# Patient Record
Sex: MALE | Race: WHITE | URBAN - METROPOLITAN AREA
[De-identification: names, ages, dates, MRNs, and addresses within clinical notes are randomized per-mention and may not be internally consistent; named-entity substitution may affect disease eponyms.]

---

## 2017-05-19 DIAGNOSIS — Z47.1 AFTERCARE FOLLOWING JOINT REPLACEMENT: Primary | ICD-10-CM

## 2017-05-31 ENCOUNTER — HOSPITAL ENCOUNTER (OUTPATIENT)
Dept: NUCLEAR MEDICINE | Facility: CLINIC | Age: 72
Setting detail: NUCLEAR MEDICINE
End: 2017-05-31
Attending: ORTHOPAEDIC SURGERY
Payer: MEDICARE

## 2017-05-31 ENCOUNTER — HOSPITAL ENCOUNTER (OUTPATIENT)
Dept: NUCLEAR MEDICINE | Facility: CLINIC | Age: 72
Setting detail: NUCLEAR MEDICINE
Discharge: HOME | End: 2017-05-31
Attending: ORTHOPAEDIC SURGERY | Admitting: ORTHOPAEDIC SURGERY
Payer: MEDICARE

## 2017-05-31 DIAGNOSIS — M25.551 RIGHT HIP PAIN: ICD-10-CM

## 2017-05-31 PROCEDURE — 34300033 ZZH RX 343: Performed by: ORTHOPAEDIC SURGERY

## 2017-05-31 PROCEDURE — A9561 TC99M OXIDRONATE: HCPCS | Performed by: ORTHOPAEDIC SURGERY

## 2017-05-31 PROCEDURE — 78315 BONE IMAGING 3 PHASE: CPT

## 2017-05-31 RX ADMIN — Medication 26 MILLICURIE: at 09:03

## 2017-06-01 ENCOUNTER — HOSPITAL ENCOUNTER (OUTPATIENT)
Dept: GENERAL RADIOLOGY | Facility: CLINIC | Age: 72
Discharge: HOME | End: 2017-06-01
Attending: ORTHOPAEDIC SURGERY | Admitting: ORTHOPAEDIC SURGERY
Payer: MEDICARE

## 2017-06-01 VITALS — OXYGEN SATURATION: 100 % | DIASTOLIC BLOOD PRESSURE: 74 MMHG | HEART RATE: 78 BPM | SYSTOLIC BLOOD PRESSURE: 154 MMHG

## 2017-06-01 DIAGNOSIS — M25.551 RIGHT HIP PAIN: ICD-10-CM

## 2017-06-01 LAB
APPEARANCE FLD: NORMAL
COLOR FLD: NORMAL
LYMPHOCYTES NFR FLD MANUAL: 13 %
MONOS+MACROS NFR FLD MANUAL: 2 %
NEUTS BAND NFR FLD MANUAL: 85 %
SPECIMEN SOURCE FLD: NORMAL
WBC # FLD AUTO: NORMAL /UL

## 2017-06-01 PROCEDURE — 86140 C-REACTIVE PROTEIN: CPT | Performed by: ORTHOPAEDIC SURGERY

## 2017-06-01 PROCEDURE — 89051 BODY FLUID CELL COUNT: CPT | Performed by: ORTHOPAEDIC SURGERY

## 2017-06-01 PROCEDURE — 25000125 ZZHC RX 250: Performed by: PHYSICIAN ASSISTANT

## 2017-06-01 PROCEDURE — 87075 CULTR BACTERIA EXCEPT BLOOD: CPT | Performed by: ORTHOPAEDIC SURGERY

## 2017-06-01 PROCEDURE — 87070 CULTURE OTHR SPECIMN AEROBIC: CPT | Performed by: ORTHOPAEDIC SURGERY

## 2017-06-01 PROCEDURE — 83516 IMMUNOASSAY NONANTIBODY: CPT | Performed by: ORTHOPAEDIC SURGERY

## 2017-06-01 PROCEDURE — 84311 SPECTROPHOTOMETRY: CPT | Performed by: ORTHOPAEDIC SURGERY

## 2017-06-01 PROCEDURE — 27211111 XR JOINT ASPIRATION MAJOR RIGHT

## 2017-06-01 RX ORDER — LIDOCAINE HYDROCHLORIDE 10 MG/ML
30 INJECTION, SOLUTION EPIDURAL; INFILTRATION; INTRACAUDAL; PERINEURAL ONCE
Status: COMPLETED | OUTPATIENT
Start: 2017-06-01 | End: 2017-06-01

## 2017-06-01 RX ADMIN — LIDOCAINE HYDROCHLORIDE 3 ML: 10 INJECTION, SOLUTION EPIDURAL; INFILTRATION; INTRACAUDAL; PERINEURAL at 09:32

## 2017-06-01 NOTE — DISCHARGE INSTRUCTIONS
Orthopedic Discharge Instructions:   After Your Injection or Aspiration  ________________________________________    Patient Name:  Nhan Marc  Today's Date:  June 1, 2017  The doctor who performed your Joint Aspiration was Alan Stewart PA-C at [facility]  in the  General Radiology Department  Care of needle site    If you have new numbness down your leg, this may last up to 6 hours, but it should go away. You may need help with walking until your leg feels normal.     Over the next 24 to 48 hours, pain at the needle site may increase before it gets better.     For the next 48 hours, use ice packs for 15 minutes, three to four times a day for pain.    If you have a bandage, you may remove it the next morning.     No tub baths, hot tubs or swimming for 48 hours. You may shower the next day.   Activity    Do not drive until morning.     Limit your activity based on your pain level. Follow your doctor s orders for activity.     You may eat a normal diet.     If you had sedation,   - You may feel sleepy, forgetful or unsteady.   - Do not drink alcohol for 24 hours.  Medicines    If you take aspirin or platelet inhibitors, you can restart them tomorrow.     Restart all other medicines today at your regular dose, including Coumadin (warfarin).    If you are restarting Coumadin, talk to your doctor about having your INR checked.   If you had a steroid shot     The medicine should help reduce swelling and pain. This may take from 7 to 10 days.     Side effects from the shot will be mild and go away in 2 to 3 days. Common side effects may include:  -  Insomnia (trouble sleeping).  -  Heartburn.  -  Flushed face.  -  Water retention (bloating or fluid build-up).  -  Increased appetite (feeling more hungry than usual).  -  Increased blood sugar.  If you have diabetes, watch your blood sugar closely. If needed, call your doctor to help you control your blood sugar.  Some patients will get lasting  relief from a single shot. Others may require up to three shots to get results. If you have more than one steroid shot, they should be given two weeks apart.  Some patients do not have relief of symptoms.    Follow-up:  No follow-up needed     Call your doctor or go to the Emergency Room if you have severe pain, fever or problems with bowel or bladder control.

## 2017-06-01 NOTE — PROGRESS NOTES
RADIOLOGY PROCEDURE NOTE  Patient name: Nhan Marc  MRN: [id number]  : 1945    Pre-procedure diagnosis: Right hip pain, rule out infection, right hip arthroplasty  Post-procedure diagnosis: Same    Procedure Date/Time: 2017  9:47 AM  Procedure: Right hip joint aspiration  Estimated blood loss: None  Specimen(s) collected with description: 0.5ml bloody synovial fluid; instilled 3cc saline and re-aspirated 2cc saline from joint  The patient tolerated the procedure well with no immediate complications.  Significant findings:none    See imaging dictation for procedural details.    Provider name: Alan Stewart  Assistant(s):None

## 2017-06-01 NOTE — IP AVS SNAPSHOT
MRN:[id number]                      After Visit Summary   6/1/2017    Nhan Marc    MRN: [id number]           Visit Information        Provider Department      6/1/2017  9:00 AM [facility] RAD; [facility] Radiology           Review of your medicines      UNREVIEWED medicines. Ask your doctor about these medicines        Dose / Directions    ALPHA LIPOIC ACID PO        Dose:  1 tablet   Take 1 tablet by mouth daily OTC- unknown strength   Refills:  0       COENZYME Q-10 PO        Dose:  100 mg   Take 100 mg by mouth daily   Refills:  0       multivitamin, therapeutic with minerals Tabs tablet        Dose:  1 tablet   Take 1 tablet by mouth daily   Refills:  0       NONFORMULARY        Dose:  1 capsule   Take 1 capsule by mouth 3 times daily (with meals) OTC: GC for Joint Health Ingredients Yu[place] Stalk 525mg Aged Garlic 300mg Artichoke Powder 300mg Milk Thistle 225mg Turmeric 150mg   Refills:  0       oxyCODONE 5 MG IR tablet   Commonly known as:  ROXICODONE   Used for:  Left hip pain        Dose:  5-10 mg   Take 1-2 tablets (5-10 mg) by mouth every 4 hours as needed for moderate to severe pain   Quantity:  50 tablet   Refills:  0       PROBIOTIC PO        Dose:  1 capsule   Take 1 capsule by mouth daily   Refills:  0                Protect others around you: Learn how to safely use, store and throw away your medicines at www.disposemymeds.org.         Follow-ups after your visit        Your next 10 appointments already scheduled     Jul 07, 2017   Procedure with Selvin Witt MD   [facility] PeriOP Services (--)    [facility] [address]  [facility] [id number] [phone]               Care Instructions        Further instructions from your care team         Orthopedic Discharge Instructions:   After Your Injection or Aspiration  ________________________________________    Patient Name:  Nhan Marc  Today's Date:  June 1, 2017  The doctor who performed  your Joint Aspiration was Alan Stewart PA-C at [facility]  in the  General Radiology Department  Care of needle site    If you have new numbness down your leg, this may last up to 6 hours, but it should go away. You may need help with walking until your leg feels normal.     Over the next 24 to 48 hours, pain at the needle site may increase before it gets better.     For the next 48 hours, use ice packs for 15 minutes, three to four times a day for pain.    If you have a bandage, you may remove it the next morning.     No tub baths, hot tubs or swimming for 48 hours. You may shower the next day.   Activity    Do not drive until morning.     Limit your activity based on your pain level. Follow your doctor s orders for activity.     You may eat a normal diet.     If you had sedation,   - You may feel sleepy, forgetful or unsteady.   - Do not drink alcohol for 24 hours.  Medicines    If you take aspirin or platelet inhibitors, you can restart them tomorrow.     Restart all other medicines today at your regular dose, including Coumadin (warfarin).    If you are restarting Coumadin, talk to your doctor about having your INR checked.   If you had a steroid shot     The medicine should help reduce swelling and pain. This may take from 7 to 10 days.     Side effects from the shot will be mild and go away in 2 to 3 days. Common side effects may include:  -  Insomnia (trouble sleeping).  -  Heartburn.  -  Flushed face.  -  Water retention (bloating or fluid build-up).  -  Increased appetite (feeling more hungry than usual).  -  Increased blood sugar.  If you have diabetes, watch your blood sugar closely. If needed, call your doctor to help you control your blood sugar.  Some patients will get lasting relief from a single shot. Others may require up to three shots to get results. If you have more than one steroid shot, they should be given two weeks apart.  Some patients do not have relief of  "symptoms.    Follow-up:  No follow-up needed     Call your doctor or go to the Emergency Room if you have severe pain, fever or problems with bowel or bladder control.      Additional Information About Your Visit        [employer] Information     [employer] lets you send messages to your doctor, view your test results, renew your prescriptions, schedule appointments and more. To sign up, go to [email or web address] . Click on \"Log in\" on the left side of the screen, which will take you to the Welcome page. Then click on \"Sign up Now\" on the right side of the page.     You will be asked to enter the access code listed below, as well as some personal information. Please follow the directions to create your username and password.     Your access code is: [email or web address]  Expires: 2017  9:07 AM     Your access code will  in 90 days. If you need help or a new code, please call your [facility] clinic or [phone].        Care EveryWhere ID     This is your Care EveryWhere ID. This could be used by other organizations to access your [facility] medical records  [id number]         Primary Care Provider Office Phone # Fax #    Kole Parker [phone]      Thank you!     Thank you for choosing [facility] for your care. Our goal is always to provide you with excellent care. Hearing back from our patients is one way we can continue to improve our services. Please take a few minutes to complete the written survey that you may receive in the mail after you visit with us. Thank you!             Medication List: This is a list of all your medications and when to take them. Check marks below indicate your daily home schedule. Keep this list as a reference.      Medications           Morning Afternoon Evening Bedtime As Needed    ALPHA LIPOIC ACID PO   Take 1 tablet by mouth daily OTC- unknown strength                                COENZYME Q-10 PO   Take 100 mg by mouth daily                                " multivitamin, therapeutic with minerals Tabs tablet   Take 1 tablet by mouth daily                                NONFORMULARY   Take 1 capsule by mouth 3 times daily (with meals) OTC: GC for Joint Health Ingredients Yucca Stalk 525mg Aged Garlic 300mg Artichoke Powder 300mg Milk Thistle 225mg Turmeric 150mg                                oxyCODONE 5 MG IR tablet   Commonly known as:  ROXICODONE   Take 1-2 tablets (5-10 mg) by mouth every 4 hours as needed for moderate to severe pain                                PROBIOTIC PO   Take 1 capsule by mouth daily

## 2017-06-01 NOTE — IP AVS SNAPSHOT
[facility] Radiology    [address] [address]    Phone:  [phone]                                       After Visit Summary   6/1/2017    Nhan Marc    MRN: [id number]           After Visit Summary Signature Page     I have received my discharge instructions, and my questions have been answered. I have discussed any challenges I see with this plan with the nurse or doctor.    ..........................................................................................................................................  Patient/Patient Representative Signature      ..........................................................................................................................................  Patient Representative Print Name and Relationship to Patient    ..................................................               ................................................  Date                                            Time    ..........................................................................................................................................  Reviewed by Signature/Title    ...................................................              ..............................................  Date                                                            Time

## 2017-06-06 LAB
BACTERIA SPEC CULT: NO GROWTH
Lab: NORMAL
MICRO REPORT STATUS: NORMAL
SPECIMEN SOURCE: NORMAL

## 2017-06-08 LAB — SYNOVASURE PERIPROSTHETIC JOINT INFECTION DECTION: NORMAL

## 2017-06-15 LAB
BACTERIA SPEC CULT: NORMAL
Lab: NORMAL
MICRO REPORT STATUS: NORMAL
SPECIMEN SOURCE: NORMAL

## 2017-06-22 ENCOUNTER — TRANSFERRED RECORDS (OUTPATIENT)
Dept: HEALTH INFORMATION MANAGEMENT | Facility: CLINIC | Age: 72
End: 2017-06-22

## 2017-06-26 ENCOUNTER — TRANSFERRED RECORDS (OUTPATIENT)
Dept: HEALTH INFORMATION MANAGEMENT | Facility: CLINIC | Age: 72
End: 2017-06-26

## 2017-06-30 ENCOUNTER — ANESTHESIA (OUTPATIENT)
Dept: SURGERY | Facility: CLINIC | Age: 72
DRG: 464 | End: 2017-06-30
Payer: MEDICARE

## 2017-06-30 ENCOUNTER — ANESTHESIA EVENT (OUTPATIENT)
Dept: SURGERY | Facility: CLINIC | Age: 72
DRG: 464 | End: 2017-06-30
Payer: MEDICARE

## 2017-06-30 ENCOUNTER — HOSPITAL ENCOUNTER (INPATIENT)
Facility: CLINIC | Age: 72
LOS: 3 days | Discharge: HOME | DRG: 464 | End: 2017-07-03
Attending: ORTHOPAEDIC SURGERY | Admitting: ORTHOPAEDIC SURGERY
Payer: MEDICARE

## 2017-06-30 ENCOUNTER — APPOINTMENT (OUTPATIENT)
Dept: GENERAL RADIOLOGY | Facility: CLINIC | Age: 72
DRG: 464 | End: 2017-06-30
Attending: ORTHOPAEDIC SURGERY
Payer: MEDICARE

## 2017-06-30 DIAGNOSIS — Z96.641 STATUS POST RIGHT HIP REPLACEMENT: Primary | ICD-10-CM

## 2017-06-30 DIAGNOSIS — A49.1 INFECTION DUE TO BETA-HEMOLYTIC STREPTOCOCCUS: ICD-10-CM

## 2017-06-30 DIAGNOSIS — T84.51XA INFECTION OF RIGHT PROSTHETIC HIP JOINT (H): ICD-10-CM

## 2017-06-30 LAB
ABO + RH BLD: NORMAL
ABO + RH BLD: NORMAL
BACTERIA SPEC CULT: NORMAL
BLD GP AB SCN SERPL QL: NORMAL
BLD PROD TYP BPU: NORMAL
BLOOD BANK CMNT PATIENT-IMP: NORMAL
CREAT SERPL-MCNC: 0.68 MG/DL (ref 0.66–1.25)
GFR SERPL CREATININE-BSD FRML MDRD: NORMAL ML/MIN/1.7M2
GRAM STN SPEC: NORMAL
HGB BLD-MCNC: 12.6 G/DL (ref 13.3–17.7)
HGB BLD-MCNC: 9 G/DL (ref 13.3–17.7)
Lab: NORMAL
MICRO REPORT STATUS: NORMAL
NUM BPU REQUESTED: 2
PLATELET # BLD AUTO: 329 10E9/L (ref 150–450)
SPECIMEN EXP DATE BLD: NORMAL
SPECIMEN SOURCE: NORMAL

## 2017-06-30 PROCEDURE — 82803 BLOOD GASES ANY COMBINATION: CPT

## 2017-06-30 PROCEDURE — 25000128 H RX IP 250 OP 636: Performed by: NURSE ANESTHETIST, CERTIFIED REGISTERED

## 2017-06-30 PROCEDURE — 36000063 ZZH SURGERY LEVEL 4 EA 15 ADDTL MIN: Performed by: ORTHOPAEDIC SURGERY

## 2017-06-30 PROCEDURE — A9270 NON-COVERED ITEM OR SERVICE: HCPCS | Mod: GY | Performed by: PHYSICIAN ASSISTANT

## 2017-06-30 PROCEDURE — 27211024 ZZHC OR SUPPLY OTHER OPNP: Performed by: ORTHOPAEDIC SURGERY

## 2017-06-30 PROCEDURE — 88331 PATH CONSLTJ SURG 1 BLK 1SPC: CPT | Mod: 26,76 | Performed by: ORTHOPAEDIC SURGERY

## 2017-06-30 PROCEDURE — 40000986 XR PELVIS AD HIP PORTABLE RIGHT 1 VIEW

## 2017-06-30 PROCEDURE — 87077 CULTURE AEROBIC IDENTIFY: CPT | Performed by: ORTHOPAEDIC SURGERY

## 2017-06-30 PROCEDURE — 27810169 ZZH OR IMPLANT GENERAL: Performed by: ORTHOPAEDIC SURGERY

## 2017-06-30 PROCEDURE — C1776 JOINT DEVICE (IMPLANTABLE): HCPCS | Performed by: ORTHOPAEDIC SURGERY

## 2017-06-30 PROCEDURE — 25000125 ZZHC RX 250: Performed by: NURSE ANESTHETIST, CERTIFIED REGISTERED

## 2017-06-30 PROCEDURE — 71000012 ZZH RECOVERY PHASE 1 LEVEL 1 FIRST HR: Performed by: ORTHOPAEDIC SURGERY

## 2017-06-30 PROCEDURE — 87186 SC STD MICRODIL/AGAR DIL: CPT | Performed by: ORTHOPAEDIC SURGERY

## 2017-06-30 PROCEDURE — 25000132 ZZH RX MED GY IP 250 OP 250 PS 637: Mod: GY | Performed by: ORTHOPAEDIC SURGERY

## 2017-06-30 PROCEDURE — 87070 CULTURE OTHR SPECIMN AEROBIC: CPT | Performed by: ORTHOPAEDIC SURGERY

## 2017-06-30 PROCEDURE — 88305 TISSUE EXAM BY PATHOLOGIST: CPT | Mod: 26 | Performed by: ORTHOPAEDIC SURGERY

## 2017-06-30 PROCEDURE — 36415 COLL VENOUS BLD VENIPUNCTURE: CPT | Performed by: ANESTHESIOLOGY

## 2017-06-30 PROCEDURE — 99221 1ST HOSP IP/OBS SF/LOW 40: CPT | Performed by: PHYSICIAN ASSISTANT

## 2017-06-30 PROCEDURE — 37000009 ZZH ANESTHESIA TECHNICAL FEE, EACH ADDTL 15 MIN: Performed by: ORTHOPAEDIC SURGERY

## 2017-06-30 PROCEDURE — 84295 ASSAY OF SERUM SODIUM: CPT

## 2017-06-30 PROCEDURE — 25000128 H RX IP 250 OP 636: Performed by: ORTHOPAEDIC SURGERY

## 2017-06-30 PROCEDURE — 88331 PATH CONSLTJ SURG 1 BLK 1SPC: CPT | Performed by: ORTHOPAEDIC SURGERY

## 2017-06-30 PROCEDURE — 12000007 ZZH R&B INTERMEDIATE

## 2017-06-30 PROCEDURE — 87075 CULTR BACTERIA EXCEPT BLOOD: CPT | Performed by: ORTHOPAEDIC SURGERY

## 2017-06-30 PROCEDURE — 36000093 ZZH SURGERY LEVEL 4 1ST 30 MIN: Performed by: ORTHOPAEDIC SURGERY

## 2017-06-30 PROCEDURE — 85049 AUTOMATED PLATELET COUNT: CPT | Performed by: PHYSICIAN ASSISTANT

## 2017-06-30 PROCEDURE — 86923 COMPATIBILITY TEST ELECTRIC: CPT | Performed by: PHYSICIAN ASSISTANT

## 2017-06-30 PROCEDURE — 25000566 ZZH SEVOFLURANE, EA 15 MIN: Performed by: ORTHOPAEDIC SURGERY

## 2017-06-30 PROCEDURE — 85049 AUTOMATED PLATELET COUNT: CPT | Performed by: ORTHOPAEDIC SURGERY

## 2017-06-30 PROCEDURE — 40000170 ZZH STATISTIC PRE-PROCEDURE ASSESSMENT II: Performed by: ORTHOPAEDIC SURGERY

## 2017-06-30 PROCEDURE — 0SH908Z INSERTION OF SPACER INTO RIGHT HIP JOINT, OPEN APPROACH: ICD-10-PCS | Performed by: ORTHOPAEDIC SURGERY

## 2017-06-30 PROCEDURE — 99207 ZZC CONSULT E&M CHANGED TO INITIAL LEVEL: CPT | Performed by: PHYSICIAN ASSISTANT

## 2017-06-30 PROCEDURE — 82565 ASSAY OF CREATININE: CPT | Performed by: PHYSICIAN ASSISTANT

## 2017-06-30 PROCEDURE — 87147 CULTURE TYPE IMMUNOLOGIC: CPT | Performed by: ORTHOPAEDIC SURGERY

## 2017-06-30 PROCEDURE — 27210794 ZZH OR GENERAL SUPPLY STERILE: Performed by: ORTHOPAEDIC SURGERY

## 2017-06-30 PROCEDURE — 37000008 ZZH ANESTHESIA TECHNICAL FEE, 1ST 30 MIN: Performed by: ORTHOPAEDIC SURGERY

## 2017-06-30 PROCEDURE — 25800025 ZZH RX 258: Performed by: ORTHOPAEDIC SURGERY

## 2017-06-30 PROCEDURE — 86850 RBC ANTIBODY SCREEN: CPT | Performed by: PHYSICIAN ASSISTANT

## 2017-06-30 PROCEDURE — 87205 SMEAR GRAM STAIN: CPT | Performed by: ORTHOPAEDIC SURGERY

## 2017-06-30 PROCEDURE — 25000125 ZZHC RX 250: Performed by: PHYSICIAN ASSISTANT

## 2017-06-30 PROCEDURE — 86901 BLOOD TYPING SEROLOGIC RH(D): CPT | Performed by: PHYSICIAN ASSISTANT

## 2017-06-30 PROCEDURE — 85018 HEMOGLOBIN: CPT | Performed by: ANESTHESIOLOGY

## 2017-06-30 PROCEDURE — 88305 TISSUE EXAM BY PATHOLOGIST: CPT | Performed by: ORTHOPAEDIC SURGERY

## 2017-06-30 PROCEDURE — 25000125 ZZHC RX 250

## 2017-06-30 PROCEDURE — 85014 HEMATOCRIT: CPT

## 2017-06-30 PROCEDURE — 85018 HEMOGLOBIN: CPT | Performed by: PHYSICIAN ASSISTANT

## 2017-06-30 PROCEDURE — 86900 BLOOD TYPING SEROLOGIC ABO: CPT | Performed by: PHYSICIAN ASSISTANT

## 2017-06-30 PROCEDURE — 25000128 H RX IP 250 OP 636: Performed by: ANESTHESIOLOGY

## 2017-06-30 PROCEDURE — 25000132 ZZH RX MED GY IP 250 OP 250 PS 637: Mod: GY | Performed by: PHYSICIAN ASSISTANT

## 2017-06-30 PROCEDURE — 25000128 H RX IP 250 OP 636: Performed by: PHYSICIAN ASSISTANT

## 2017-06-30 PROCEDURE — 0SP90JZ REMOVAL OF SYNTHETIC SUBSTITUTE FROM RIGHT HIP JOINT, OPEN APPROACH: ICD-10-PCS | Performed by: ORTHOPAEDIC SURGERY

## 2017-06-30 PROCEDURE — P9041 ALBUMIN (HUMAN),5%, 50ML: HCPCS | Performed by: NURSE ANESTHETIST, CERTIFIED REGISTERED

## 2017-06-30 PROCEDURE — 27210995 ZZH RX 272: Performed by: ORTHOPAEDIC SURGERY

## 2017-06-30 PROCEDURE — 71000013 ZZH RECOVERY PHASE 1 LEVEL 1 EA ADDTL HR: Performed by: ORTHOPAEDIC SURGERY

## 2017-06-30 PROCEDURE — A9270 NON-COVERED ITEM OR SERVICE: HCPCS | Mod: GY | Performed by: ORTHOPAEDIC SURGERY

## 2017-06-30 PROCEDURE — 84132 ASSAY OF SERUM POTASSIUM: CPT

## 2017-06-30 PROCEDURE — 87176 TISSUE HOMOGENIZATION CULTR: CPT | Performed by: ORTHOPAEDIC SURGERY

## 2017-06-30 DEVICE — IMP CUP ACET DEPUY SPACER PROSTALAC 32X42MM 1541-42-320: Type: IMPLANTABLE DEVICE | Site: ACETABULUM | Status: FUNCTIONAL

## 2017-06-30 DEVICE — IMPLANTABLE DEVICE: Type: IMPLANTABLE DEVICE | Site: ACETABULUM | Status: FUNCTIONAL

## 2017-06-30 DEVICE — BONE CEMENT SIMPLEX FULL DOSE 6191-1-001: Type: IMPLANTABLE DEVICE | Site: FEMUR | Status: FUNCTIONAL

## 2017-06-30 RX ORDER — AMOXICILLIN 250 MG
1-2 CAPSULE ORAL 2 TIMES DAILY
Status: DISCONTINUED | OUTPATIENT
Start: 2017-06-30 | End: 2017-07-03 | Stop reason: HOSPADM

## 2017-06-30 RX ORDER — LIDOCAINE 40 MG/G
CREAM TOPICAL
Status: DISCONTINUED | OUTPATIENT
Start: 2017-06-30 | End: 2017-07-03 | Stop reason: HOSPADM

## 2017-06-30 RX ORDER — MEPERIDINE HYDROCHLORIDE 25 MG/ML
12.5 INJECTION INTRAMUSCULAR; INTRAVENOUS; SUBCUTANEOUS EVERY 5 MIN PRN
Status: DISCONTINUED | OUTPATIENT
Start: 2017-06-30 | End: 2017-06-30 | Stop reason: HOSPADM

## 2017-06-30 RX ORDER — ACETAMINOPHEN 325 MG/1
975 TABLET ORAL EVERY 8 HOURS
Status: COMPLETED | OUTPATIENT
Start: 2017-06-30 | End: 2017-07-03

## 2017-06-30 RX ORDER — CEFAZOLIN SODIUM 1 G/3ML
1 INJECTION, POWDER, FOR SOLUTION INTRAMUSCULAR; INTRAVENOUS SEE ADMIN INSTRUCTIONS
Status: DISCONTINUED | OUTPATIENT
Start: 2017-06-30 | End: 2017-06-30 | Stop reason: HOSPADM

## 2017-06-30 RX ORDER — SODIUM CHLORIDE, SODIUM LACTATE, POTASSIUM CHLORIDE, CALCIUM CHLORIDE 600; 310; 30; 20 MG/100ML; MG/100ML; MG/100ML; MG/100ML
INJECTION, SOLUTION INTRAVENOUS CONTINUOUS
Status: DISCONTINUED | OUTPATIENT
Start: 2017-06-30 | End: 2017-06-30 | Stop reason: HOSPADM

## 2017-06-30 RX ORDER — ALBUMIN, HUMAN INJ 5% 5 %
SOLUTION INTRAVENOUS CONTINUOUS PRN
Status: DISCONTINUED | OUTPATIENT
Start: 2017-06-30 | End: 2017-06-30

## 2017-06-30 RX ORDER — FENTANYL CITRATE 0.05 MG/ML
25-50 INJECTION, SOLUTION INTRAMUSCULAR; INTRAVENOUS
Status: DISCONTINUED | OUTPATIENT
Start: 2017-06-30 | End: 2017-06-30 | Stop reason: HOSPADM

## 2017-06-30 RX ORDER — OXYCODONE HYDROCHLORIDE 5 MG/1
5-10 TABLET ORAL
Status: DISCONTINUED | OUTPATIENT
Start: 2017-06-30 | End: 2017-07-03 | Stop reason: HOSPADM

## 2017-06-30 RX ORDER — ONDANSETRON 2 MG/ML
INJECTION INTRAMUSCULAR; INTRAVENOUS PRN
Status: DISCONTINUED | OUTPATIENT
Start: 2017-06-30 | End: 2017-06-30

## 2017-06-30 RX ORDER — PROPOFOL 10 MG/ML
INJECTION, EMULSION INTRAVENOUS PRN
Status: DISCONTINUED | OUTPATIENT
Start: 2017-06-30 | End: 2017-06-30

## 2017-06-30 RX ORDER — NALOXONE HYDROCHLORIDE 0.4 MG/ML
.1-.4 INJECTION, SOLUTION INTRAMUSCULAR; INTRAVENOUS; SUBCUTANEOUS
Status: DISCONTINUED | OUTPATIENT
Start: 2017-06-30 | End: 2017-07-03 | Stop reason: HOSPADM

## 2017-06-30 RX ORDER — ACETAMINOPHEN 500 MG
1000 TABLET ORAL ONCE
Status: COMPLETED | OUTPATIENT
Start: 2017-06-30 | End: 2017-06-30

## 2017-06-30 RX ORDER — MAGNESIUM CARB/ALUMINUM HYDROX 105-160MG
TABLET,CHEWABLE ORAL PRN
Status: DISCONTINUED | OUTPATIENT
Start: 2017-06-30 | End: 2017-06-30 | Stop reason: HOSPADM

## 2017-06-30 RX ORDER — ONDANSETRON 4 MG/1
4 TABLET, ORALLY DISINTEGRATING ORAL EVERY 6 HOURS PRN
Status: DISCONTINUED | OUTPATIENT
Start: 2017-06-30 | End: 2017-07-03 | Stop reason: HOSPADM

## 2017-06-30 RX ORDER — ONDANSETRON 4 MG/1
4 TABLET, ORALLY DISINTEGRATING ORAL EVERY 30 MIN PRN
Status: DISCONTINUED | OUTPATIENT
Start: 2017-06-30 | End: 2017-06-30 | Stop reason: HOSPADM

## 2017-06-30 RX ORDER — SODIUM CHLORIDE 9 MG/ML
INJECTION, SOLUTION INTRAVENOUS CONTINUOUS PRN
Status: DISCONTINUED | OUTPATIENT
Start: 2017-06-30 | End: 2017-06-30

## 2017-06-30 RX ORDER — CEFAZOLIN SODIUM 2 G/100ML
2 INJECTION, SOLUTION INTRAVENOUS EVERY 8 HOURS
Status: COMPLETED | OUTPATIENT
Start: 2017-07-01 | End: 2017-07-01

## 2017-06-30 RX ORDER — CYCLOBENZAPRINE HCL 5 MG
5 TABLET ORAL 3 TIMES DAILY PRN
Status: DISCONTINUED | OUTPATIENT
Start: 2017-06-30 | End: 2017-07-03 | Stop reason: HOSPADM

## 2017-06-30 RX ORDER — DEXTROSE MONOHYDRATE, SODIUM CHLORIDE, AND POTASSIUM CHLORIDE 50; 1.49; 4.5 G/1000ML; G/1000ML; G/1000ML
INJECTION, SOLUTION INTRAVENOUS CONTINUOUS
Status: DISCONTINUED | OUTPATIENT
Start: 2017-06-30 | End: 2017-06-30

## 2017-06-30 RX ORDER — HYDROMORPHONE HYDROCHLORIDE 1 MG/ML
.3-.5 INJECTION, SOLUTION INTRAMUSCULAR; INTRAVENOUS; SUBCUTANEOUS
Status: DISCONTINUED | OUTPATIENT
Start: 2017-06-30 | End: 2017-07-03 | Stop reason: HOSPADM

## 2017-06-30 RX ORDER — ONDANSETRON 2 MG/ML
4 INJECTION INTRAMUSCULAR; INTRAVENOUS EVERY 30 MIN PRN
Status: DISCONTINUED | OUTPATIENT
Start: 2017-06-30 | End: 2017-06-30 | Stop reason: HOSPADM

## 2017-06-30 RX ORDER — CEFAZOLIN SODIUM 2 G/100ML
2 INJECTION, SOLUTION INTRAVENOUS
Status: COMPLETED | OUTPATIENT
Start: 2017-06-30 | End: 2017-06-30

## 2017-06-30 RX ORDER — HYDROXYZINE HYDROCHLORIDE 10 MG/1
10 TABLET, FILM COATED ORAL EVERY 6 HOURS PRN
Status: DISCONTINUED | OUTPATIENT
Start: 2017-06-30 | End: 2017-07-03 | Stop reason: HOSPADM

## 2017-06-30 RX ORDER — DEXAMETHASONE SODIUM PHOSPHATE 4 MG/ML
INJECTION, SOLUTION INTRA-ARTICULAR; INTRALESIONAL; INTRAMUSCULAR; INTRAVENOUS; SOFT TISSUE PRN
Status: DISCONTINUED | OUTPATIENT
Start: 2017-06-30 | End: 2017-06-30

## 2017-06-30 RX ORDER — LIDOCAINE HYDROCHLORIDE 20 MG/ML
INJECTION, SOLUTION INFILTRATION; PERINEURAL PRN
Status: DISCONTINUED | OUTPATIENT
Start: 2017-06-30 | End: 2017-06-30

## 2017-06-30 RX ORDER — ONDANSETRON 2 MG/ML
4 INJECTION INTRAMUSCULAR; INTRAVENOUS EVERY 6 HOURS PRN
Status: DISCONTINUED | OUTPATIENT
Start: 2017-06-30 | End: 2017-07-03 | Stop reason: HOSPADM

## 2017-06-30 RX ORDER — KETOROLAC TROMETHAMINE 15 MG/ML
15 INJECTION, SOLUTION INTRAMUSCULAR; INTRAVENOUS EVERY 6 HOURS
Status: COMPLETED | OUTPATIENT
Start: 2017-06-30 | End: 2017-07-01

## 2017-06-30 RX ORDER — PROCHLORPERAZINE MALEATE 5 MG
5 TABLET ORAL EVERY 6 HOURS PRN
Status: DISCONTINUED | OUTPATIENT
Start: 2017-06-30 | End: 2017-07-03 | Stop reason: HOSPADM

## 2017-06-30 RX ORDER — VECURONIUM BROMIDE 1 MG/ML
INJECTION, POWDER, LYOPHILIZED, FOR SOLUTION INTRAVENOUS PRN
Status: DISCONTINUED | OUTPATIENT
Start: 2017-06-30 | End: 2017-06-30

## 2017-06-30 RX ORDER — FENTANYL CITRATE 50 UG/ML
INJECTION, SOLUTION INTRAMUSCULAR; INTRAVENOUS PRN
Status: DISCONTINUED | OUTPATIENT
Start: 2017-06-30 | End: 2017-06-30

## 2017-06-30 RX ORDER — SODIUM CHLORIDE 9 MG/ML
INJECTION, SOLUTION INTRAVENOUS CONTINUOUS
Status: DISCONTINUED | OUTPATIENT
Start: 2017-06-30 | End: 2017-07-02

## 2017-06-30 RX ORDER — TEMAZEPAM 7.5 MG/1
7.5 CAPSULE ORAL
Status: DISCONTINUED | OUTPATIENT
Start: 2017-07-01 | End: 2017-07-03 | Stop reason: HOSPADM

## 2017-06-30 RX ORDER — NEOSTIGMINE METHYLSULFATE 1 MG/ML
VIAL (ML) INJECTION PRN
Status: DISCONTINUED | OUTPATIENT
Start: 2017-06-30 | End: 2017-06-30

## 2017-06-30 RX ORDER — ALBUTEROL SULFATE 0.83 MG/ML
2.5 SOLUTION RESPIRATORY (INHALATION) EVERY 4 HOURS PRN
Status: DISCONTINUED | OUTPATIENT
Start: 2017-06-30 | End: 2017-06-30 | Stop reason: HOSPADM

## 2017-06-30 RX ORDER — GLYCOPYRROLATE 0.2 MG/ML
INJECTION, SOLUTION INTRAMUSCULAR; INTRAVENOUS PRN
Status: DISCONTINUED | OUTPATIENT
Start: 2017-06-30 | End: 2017-06-30

## 2017-06-30 RX ORDER — ACETAMINOPHEN 325 MG/1
650 TABLET ORAL EVERY 4 HOURS PRN
Status: DISCONTINUED | OUTPATIENT
Start: 2017-07-03 | End: 2017-07-03 | Stop reason: HOSPADM

## 2017-06-30 RX ADMIN — DEXAMETHASONE SODIUM PHOSPHATE 4 MG: 4 INJECTION, SOLUTION INTRA-ARTICULAR; INTRALESIONAL; INTRAMUSCULAR; INTRAVENOUS; SOFT TISSUE at 13:49

## 2017-06-30 RX ADMIN — HYDROMORPHONE HYDROCHLORIDE 0.5 MG: 1 INJECTION, SOLUTION INTRAMUSCULAR; INTRAVENOUS; SUBCUTANEOUS at 15:11

## 2017-06-30 RX ADMIN — HYDROMORPHONE HYDROCHLORIDE 0.5 MG: 1 INJECTION, SOLUTION INTRAMUSCULAR; INTRAVENOUS; SUBCUTANEOUS at 18:22

## 2017-06-30 RX ADMIN — PHENYLEPHRINE HYDROCHLORIDE 100 MCG: 10 INJECTION, SOLUTION INTRAMUSCULAR; INTRAVENOUS; SUBCUTANEOUS at 16:56

## 2017-06-30 RX ADMIN — ALBUMIN (HUMAN): 12.5 SOLUTION INTRAVENOUS at 15:50

## 2017-06-30 RX ADMIN — TRANEXAMIC ACID 1 G: 100 INJECTION, SOLUTION INTRAVENOUS at 13:55

## 2017-06-30 RX ADMIN — ONDANSETRON 4 MG: 2 INJECTION INTRAMUSCULAR; INTRAVENOUS at 17:00

## 2017-06-30 RX ADMIN — PHENYLEPHRINE HYDROCHLORIDE 100 MCG: 10 INJECTION, SOLUTION INTRAMUSCULAR; INTRAVENOUS; SUBCUTANEOUS at 15:48

## 2017-06-30 RX ADMIN — SENNOSIDES AND DOCUSATE SODIUM 1 TABLET: 8.6; 5 TABLET ORAL at 20:38

## 2017-06-30 RX ADMIN — PHENYLEPHRINE HYDROCHLORIDE 50 MCG: 10 INJECTION, SOLUTION INTRAMUSCULAR; INTRAVENOUS; SUBCUTANEOUS at 17:12

## 2017-06-30 RX ADMIN — PHENYLEPHRINE HYDROCHLORIDE 100 MCG: 10 INJECTION, SOLUTION INTRAMUSCULAR; INTRAVENOUS; SUBCUTANEOUS at 15:53

## 2017-06-30 RX ADMIN — PHENYLEPHRINE HYDROCHLORIDE 100 MCG: 10 INJECTION, SOLUTION INTRAMUSCULAR; INTRAVENOUS; SUBCUTANEOUS at 14:05

## 2017-06-30 RX ADMIN — SODIUM CHLORIDE, POTASSIUM CHLORIDE, SODIUM LACTATE AND CALCIUM CHLORIDE: 600; 310; 30; 20 INJECTION, SOLUTION INTRAVENOUS at 16:28

## 2017-06-30 RX ADMIN — HYDROMORPHONE HYDROCHLORIDE 0.5 MG: 1 INJECTION, SOLUTION INTRAMUSCULAR; INTRAVENOUS; SUBCUTANEOUS at 18:44

## 2017-06-30 RX ADMIN — SODIUM CHLORIDE: 9 INJECTION, SOLUTION INTRAVENOUS at 20:38

## 2017-06-30 RX ADMIN — VECURONIUM BROMIDE 1 MG: 1 INJECTION, POWDER, LYOPHILIZED, FOR SOLUTION INTRAVENOUS at 15:59

## 2017-06-30 RX ADMIN — KETOROLAC TROMETHAMINE 15 MG: 15 INJECTION, SOLUTION INTRAMUSCULAR; INTRAVENOUS at 20:38

## 2017-06-30 RX ADMIN — FENTANYL CITRATE 100 MCG: 50 INJECTION, SOLUTION INTRAMUSCULAR; INTRAVENOUS at 14:27

## 2017-06-30 RX ADMIN — ALBUMIN (HUMAN): 12.5 SOLUTION INTRAVENOUS at 16:34

## 2017-06-30 RX ADMIN — FENTANYL CITRATE 50 MCG: 50 INJECTION, SOLUTION INTRAMUSCULAR; INTRAVENOUS at 14:51

## 2017-06-30 RX ADMIN — ALBUMIN (HUMAN): 12.5 SOLUTION INTRAVENOUS at 16:21

## 2017-06-30 RX ADMIN — ACETAMINOPHEN 975 MG: 325 TABLET, FILM COATED ORAL at 22:13

## 2017-06-30 RX ADMIN — SODIUM CHLORIDE: 9 INJECTION, SOLUTION INTRAVENOUS at 16:24

## 2017-06-30 RX ADMIN — PHENYLEPHRINE HYDROCHLORIDE 100 MCG: 10 INJECTION, SOLUTION INTRAMUSCULAR; INTRAVENOUS; SUBCUTANEOUS at 16:11

## 2017-06-30 RX ADMIN — GLYCOPYRROLATE 0.9 MG: 0.2 INJECTION, SOLUTION INTRAMUSCULAR; INTRAVENOUS at 17:29

## 2017-06-30 RX ADMIN — PHENYLEPHRINE HYDROCHLORIDE 50 MCG: 10 INJECTION, SOLUTION INTRAMUSCULAR; INTRAVENOUS; SUBCUTANEOUS at 17:38

## 2017-06-30 RX ADMIN — VECURONIUM BROMIDE 1 MG: 1 INJECTION, POWDER, LYOPHILIZED, FOR SOLUTION INTRAVENOUS at 16:41

## 2017-06-30 RX ADMIN — FENTANYL CITRATE 50 MCG: 50 INJECTION, SOLUTION INTRAMUSCULAR; INTRAVENOUS at 13:40

## 2017-06-30 RX ADMIN — LIDOCAINE HYDROCHLORIDE 100 MG: 20 INJECTION, SOLUTION INFILTRATION; PERINEURAL at 13:44

## 2017-06-30 RX ADMIN — PROPOFOL 160 MG: 10 INJECTION, EMULSION INTRAVENOUS at 13:44

## 2017-06-30 RX ADMIN — PHENYLEPHRINE HYDROCHLORIDE 100 MCG: 10 INJECTION, SOLUTION INTRAMUSCULAR; INTRAVENOUS; SUBCUTANEOUS at 15:30

## 2017-06-30 RX ADMIN — VECURONIUM BROMIDE 2 MG: 1 INJECTION, POWDER, LYOPHILIZED, FOR SOLUTION INTRAVENOUS at 15:09

## 2017-06-30 RX ADMIN — TRANEXAMIC ACID 1 G: 100 INJECTION, SOLUTION INTRAVENOUS at 17:07

## 2017-06-30 RX ADMIN — NEOSTIGMINE METHYLSULFATE 4.5 MG: 1 INJECTION INTRAMUSCULAR; INTRAVENOUS; SUBCUTANEOUS at 17:29

## 2017-06-30 RX ADMIN — PHENYLEPHRINE HYDROCHLORIDE 100 MCG: 10 INJECTION, SOLUTION INTRAMUSCULAR; INTRAVENOUS; SUBCUTANEOUS at 16:09

## 2017-06-30 RX ADMIN — CEFAZOLIN SODIUM 1 G: 2 INJECTION, SOLUTION INTRAVENOUS at 15:51

## 2017-06-30 RX ADMIN — ROCURONIUM BROMIDE 50 MG: 10 INJECTION INTRAVENOUS at 13:44

## 2017-06-30 RX ADMIN — PHENYLEPHRINE HYDROCHLORIDE 100 MCG: 10 INJECTION, SOLUTION INTRAMUSCULAR; INTRAVENOUS; SUBCUTANEOUS at 16:18

## 2017-06-30 RX ADMIN — FENTANYL CITRATE 50 MCG: 50 INJECTION, SOLUTION INTRAMUSCULAR; INTRAVENOUS at 18:09

## 2017-06-30 RX ADMIN — VECURONIUM BROMIDE 1 MG: 1 INJECTION, POWDER, LYOPHILIZED, FOR SOLUTION INTRAVENOUS at 15:24

## 2017-06-30 RX ADMIN — PHENYLEPHRINE HYDROCHLORIDE 100 MCG: 10 INJECTION, SOLUTION INTRAMUSCULAR; INTRAVENOUS; SUBCUTANEOUS at 16:26

## 2017-06-30 RX ADMIN — ROCURONIUM BROMIDE 20 MG: 10 INJECTION INTRAVENOUS at 14:39

## 2017-06-30 RX ADMIN — ACETAMINOPHEN 1000 MG: 500 TABLET, FILM COATED ORAL at 12:40

## 2017-06-30 RX ADMIN — CEFAZOLIN SODIUM 2 G: 2 INJECTION, SOLUTION INTRAVENOUS at 13:50

## 2017-06-30 RX ADMIN — PHENYLEPHRINE HYDROCHLORIDE 100 MCG: 10 INJECTION, SOLUTION INTRAMUSCULAR; INTRAVENOUS; SUBCUTANEOUS at 16:38

## 2017-06-30 RX ADMIN — VECURONIUM BROMIDE 1 MG: 1 INJECTION, POWDER, LYOPHILIZED, FOR SOLUTION INTRAVENOUS at 15:38

## 2017-06-30 RX ADMIN — FENTANYL CITRATE 50 MCG: 50 INJECTION, SOLUTION INTRAMUSCULAR; INTRAVENOUS at 13:44

## 2017-06-30 RX ADMIN — SODIUM CHLORIDE, POTASSIUM CHLORIDE, SODIUM LACTATE AND CALCIUM CHLORIDE: 600; 310; 30; 20 INJECTION, SOLUTION INTRAVENOUS at 12:42

## 2017-06-30 RX ADMIN — PHENYLEPHRINE HYDROCHLORIDE 100 MCG: 10 INJECTION, SOLUTION INTRAMUSCULAR; INTRAVENOUS; SUBCUTANEOUS at 16:21

## 2017-06-30 RX ADMIN — SODIUM CHLORIDE, POTASSIUM CHLORIDE, SODIUM LACTATE AND CALCIUM CHLORIDE: 600; 310; 30; 20 INJECTION, SOLUTION INTRAVENOUS at 14:51

## 2017-06-30 RX ADMIN — MIDAZOLAM HYDROCHLORIDE 2 MG: 1 INJECTION, SOLUTION INTRAMUSCULAR; INTRAVENOUS at 13:39

## 2017-06-30 RX ADMIN — HYDROMORPHONE HYDROCHLORIDE 0.25 MG: 1 INJECTION, SOLUTION INTRAMUSCULAR; INTRAVENOUS; SUBCUTANEOUS at 17:45

## 2017-06-30 RX ADMIN — PHENYLEPHRINE HYDROCHLORIDE 50 MCG: 10 INJECTION, SOLUTION INTRAMUSCULAR; INTRAVENOUS; SUBCUTANEOUS at 17:24

## 2017-06-30 NOTE — IP AVS SNAPSHOT
` ` Patient Information     Patient Name Sex     Nhan Marc ([id number]) Male 1945       Room Bed    [facility] 5512-      Patient Demographics     Address Phone E-mail Address    [address] [place] [address] [phone] (Home) *Preferred*  NONE (Work)  [phone] (Mobile) [email or web address]      Patient Ethnicity & Race     Ethnic Group Patient Race    American White      PCP and Center    Primary Care Provider  Phone Center     Kole Parker [phone] [facility]  [address] [place] [address]        Emergency Contact(s)     Name Relation Home Work Mobile    Servando Marc Spouse [phone] NONE [phone]      Documents on File        Status Date Received Description       Documents for the Patient    Affiliate Privacy placeholder   phase3    Consent for EHR Access Received 16     Insurance Card Received 16 BCBS    External Medication Information Consent       Patient ID Received 17    [facility] Specified Other       Consent for Services/Privacy Notice - Hospital/Clinic Received () 16     Privacy Notice - [place] Received 16     Insurance Card Received 17     Consent for Services/Privacy Notice - Hospital/Clinic Received 17        Documents for the Encounter    H/P - History and Physical  17 [place], HISTORY AND PHY 2017    CMS IM for Patient Signature Received 17 1MM    Lab Result - [facility] Scan  17 LABS, [facility]    EKG Cardiac - HIM Scan  17 EKG, [facility]      Admission Information     Attending Provider Admitting Provider Admission Type Admission Date/Time    Selvin Witt MD Oneill, Owen Roe, MD Elective 17  1117    Discharge Date Hospital Service Auth/Cert Status Service Area     Surgery [facility] HEALTH SERVICES    Unit Room/Bed Admission Status       [address] SPEC UNIT 55 Admission (Confirmed)       Admission     Complaint    FAILED RIGHT TOTAL HIP ,  Status post right hip replacement      Hospital Account     Name Acct ID Class Status Primary Coverage    Monico Nhan Allan [id number] Inpatient Open MEDICARE - MEDICARE FOR HB SUPPLEMENT            Guarantor Account (for Hospital Account #[id number])     Name Relation to Pt Service Area Active? Acct Type    Monico Nhan Allan Self FCS Yes Personal/Family    Address Phone          [address] [place] [address] [phone](H)  none(O)              Coverage Information (for Hospital Account #[id number])     1. MEDICARE/MEDICARE FOR HB SUPPLEMENT     F/O Payor/Plan Precert #    MEDICARE/MEDICARE FOR HB SUPPLEMENT     Subscriber Subscriber #    Nhan Marc [id number]    Address Phone    [employer]  [address]  [place] [address] [phone]          2. BCBS/BCBS PLATINUM BLUE     F/O Payor/Plan Precert #    BCBS/BCBS PLATINUM BLUE     Subscriber Subscriber #    Monico Nhan Jerrell [id number]    Address Phone    [address] [address]  [place], [place] [id number] [phone]

## 2017-06-30 NOTE — IP AVS SNAPSHOT
MRN:[id number]                      After Visit Summary   6/30/2017    Nhan Marc    MRN: [id number]           Thank you!     Thank you for choosing [facility] for your care. Our goal is always to provide you with excellent care. Hearing back from our patients is one way we can continue to improve our services. Please take a few minutes to complete the written survey that you may receive in the mail after you visit with us. Thank you!        Patient Information     Date Of Birth          1945        Designated Caregiver       Most Recent Value    Caregiver    Will someone help with your care after discharge? yes    Name of designated caregiver Servando    Phone number of caregiver [phone]  daughter Janette Marks    Caregiver address [address] [place]  (JAN)      About your hospital stay     You were admitted on:  June 30, 2017 You last received care in the:  [place] [address] [facility]    You were discharged on:  July 3, 2017        Reason for your hospital stay       Removal of total hip replacement and placement of antibiotic spacer, treatment of hip infection                  Who to Call     For medical emergencies, please call 911.  For non-urgent questions about your medical care, please call your primary care provider or clinic, [phone]  For questions related to your surgery, please call your surgery clinic        Attending Provider     Provider Specialty    Selvin Witt MD Orthopedics       Primary Care Provider Office Phone # Fax #    Kole Parker [phone]       When to contact your care team       EMERGENCY CARE PLAN     1. I have questions or concerns during clinic hours:   - I will call the clinic directly at [phone] and and speak with Dr. Joseph Barajas's care coordinator.     2. I have questions or concerns outside clinic hours:   - I will call the clinic directly at [phone] and speak with the doctor  on-call.     3. I need to schedule an appointment:   - I will call the clinic directly at [phone] for [facility] appointments or [phone] for [place] appointments.     4. I am concerned about symptoms that I am having and think I need same day treatment:   - During clinic hours, I will call the clinic first at [phone] and speak with Jenn.   - She will either make an appointment with Dr. Ruiz or she will direct you to come in to our walk-in urgent care clinic.   - The urgent care is open 7 days a week from 8:00am - 8:00pm at all of our locations.     - After clinic hours, I will call the clinic first at [phone] and speak with the on-call doctor.   - You should NOT go to the emergency room or a urgent care with out being directed to do so by the clinic.                  After Care Instructions     Activity       Your activity upon discharge: activity as tolerated.  You should work on home exercises provided by the physical therapist at the hospital 2-3 times a day.     Physical Therapy: Once you leave the hospital you will not need outpatient physical therapy for your hip.  You can weight bear as tolerated with a gait aide.  You should avoid bending past 90 degrees.  You should avoid lifting you leg out to the side.    Assistive Ambulatory Devices:  Use your walker/crutches at all times.     Elevate: Swelling in the leg is normal after a hip surgery.  By keeping your leg elevated with a pillow under your calf, not under the knee, will help with the swelling.  The best position is to have the knee above the level of your heart and your ankle above the level of your knee.  Wearing the compression stockings (Mason hose) can help reduce swelling.  You should wear these until you are seen at Dr. Ruiz's office post operatively.  You can remove these twice a day for laundering and hygiene.  The swelling in the leg will be present for at least 4-6 weeks.       Ice: Ice the hip 4-5 times a day for 20-30  minutes at a time.  Icing will help reduce swelling and pain.     Pain control: Take the pain medication and/or anti-inflammatory medication as prescribed.  Don't let your pain become severe.            Diet       Follow this diet upon discharge: Regular            Discharge Instructions       Upon leaving the hospital you will be discharged with a few different medications:     1. Aspirin 325mg - you will be taking one tablet twice a day for 5 weeks following surgery for a blood thinner to help prevent blood clots.   2. Oxycodone 5mg - this is a pain medication.  You can take one or two tablets every four to six hours.  You will need this medication the longest to sleep at night and for physical therapy.  You can wean yourself from this medication as you are able by either increasing the time between tablets or going down to one tablet if you are taking two at a time.   3. Senokot - this is a stool softener.  You can take one or two pills twice a day as needed for constipation.  You should take this medication as long as you are taking narcotic pain medication and as long as you do not have diarrhea.  As you are more active and taking less pain medication you will be able to stop using this.     Other medications not prescribed:   1. Tylenol - you can take Tylenol in addition to the pain medication.  Do not exceed 3,000mg in a day.   2. Ibuprofen - we would like you to avoid taking ibuprofen while you are taking the aspirin.   3. Iron - we typically do not prescribe an iron supplement pill after surgery however, if you want to take one we recommend 324 or 325mg daily.  These pills will make you more constipated.     Please contact Dr. Ruiz's office with any questions.  You can either call Dr. Joseph Barajas's , at [phone] or email Dr. Joseph Elliott's physician assistant, at [email or web address].            Wound care and dressings       You have an Aquacel dressing in place.  This is a  waterproof dressing that does not need to be changed.  It will be removed at your first post operative visit with Dr. Ruiz.  You may get this dressing wet in the shower but do not soak it.  If it starts to come off or becomes saturated call Dr. Ruiz's office for further instructions.                  Follow-up Appointments     Follow-up and recommended labs and tests       Your follow-up appointment is schedule for 4:40pm on Wednesday 7/12 at the [place] office with Dr. Ruiz.  Please call [phone] if you need to reschedule this appointment.     If you have any questions prior to your follow-up appointment please call Dr. Joseph Barajas's , at [phone].  You may also email Lexi with any questions at [email or web address].                  Further instructions from your care team       Attend outpatient IV therapy as ordered by Dr. Parker/Carolynn Urbina at the [facility] outpatient IV clinic.    Pending Results     Date and Time Order Name Status Description    6/30/2017 1539 Tissue Culture Aerobic Bacterial Preliminary     6/30/2017 1539 Anaerobic bacterial culture Preliminary     6/30/2017 1535 Anaerobic bacterial culture Preliminary     6/30/2017 1458 Anaerobic bacterial culture Preliminary     6/30/2017 1457 Tissue Culture Aerobic Bacterial Preliminary     6/30/2017 1445 Wound Culture Aerobic Bacterial Preliminary     6/30/2017 1445 Anaerobic bacterial culture Preliminary     6/30/2017 1440 Wound Culture Aerobic Bacterial Preliminary     6/30/2017 1440 Anaerobic bacterial culture Preliminary             Statement of Approval     Ordered          07/03/17 1641  I have reviewed and agree with all the recommendations and orders detailed in this document.  EFFECTIVE NOW     Approved and electronically signed by:  Selvin Witt MD             Admission Information     Date & Time Provider Department Dept. Phone    6/30/2017 Selvin Witt MD [place] [address] [facility]  "[phone]      Your Vitals Were     Blood Pressure Pulse Temperature Respirations Height Weight    129/60 87 98.8  F (37.1  C) (Oral) 16 1.753 m (5' 9\") 86.2 kg (190 lb)    Pulse Oximetry BMI (Body Mass Index)                96% 28.06 kg/m2          [employer] Information     Share0 lets you send messages to your doctor, view your test results, renew your prescriptions, schedule appointments and more. To sign up, go to [email or web address] . Click on \"Log in\" on the left side of the screen, which will take you to the Welcome page. Then click on \"Sign up Now\" on the right side of the page.     You will be asked to enter the access code listed below, as well as some personal information. Please follow the directions to create your username and password.     Your access code is: [email or web address]  Expires: 2017  9:07 AM     Your access code will  in 90 days. If you need help or a new code, please call your [facility] clinic or [phone].        Care EveryWhere ID     This is your Care EveryWhere ID. This could be used by other organizations to access your [facility] medical records  [id number]        Equal Access to Services     ELAYNE VILLALOBOS AH: Lincoln childo Soamandaali, waaxda luqadaha, qaybta kaalmada adeegyada, cherri wade. So [facility] [phone].    ATENCIÓN: Si habla español, tiene a martinez disposición servicios gratuitos de asistencia lingüística. Llame al [phone].    We comply with applicable federal civil rights laws and [place] laws. We do not discriminate on the basis of race, color, national origin, age, disability sex, sexual orientation or gender identity.               Review of your medicines      START taking        Dose / Directions    aspirin  MG EC tablet        Dose:  325 mg   Take 1 tablet (325 mg) by mouth 2 times daily   Quantity:  60 tablet   Refills:  0       cefTRIAXone 1 GM vial   Commonly known as:  ROCEPHIN        Dose:  2000 mg   Inject 2 " g (2,000 mg) into the vein daily CBC with differential, creatinine, SGOT weekly while on this medication to be faxed to Dr. Benoit office.   Quantity:  600 mL   Refills:  0       oxyCODONE 5 MG IR tablet   Commonly known as:  ROXICODONE        1 tab PO Q4 hours PRN pain 1-5, 2 tab PO Q4 hours PRN pain 6-10   Quantity:  50 tablet   Refills:  0       senna-docusate 8.6-50 MG per tablet   Commonly known as:  SENOKOT-S;PERICOLACE        Dose:  1-2 tablet   Take 1-2 tablets by mouth 2 times daily as needed for constipation   Quantity:  60 tablet   Refills:  0         CONTINUE these medicines which have NOT CHANGED        Dose / Directions    ALPHA LIPOIC ACID PO        Dose:  1 tablet   Take 1 tablet by mouth every morning OTC- unknown strength   Refills:  0       COENZYME Q-10 PO        Dose:  100 mg   Take 100 mg by mouth every morning   Refills:  0       MAGNESIUM OXIDE PO        Dose:  250 mg   Take 250 mg by mouth every morning   Refills:  0       multivitamin, therapeutic with minerals Tabs tablet        Dose:  1 tablet   Take 1 tablet by mouth every morning   Refills:  0       NUTRITIONAL SUPPLEMENT PO        Dose:  1 tablet   Take 1 tablet by mouth 2 times daily (with meals) (breakfast and dinner)   Refills:  0       PROBIOTIC PO        Dose:  1 capsule   Take 1 capsule by mouth every morning   Refills:  0       TART CHERRY ADVANCED PO        Dose:  1 capsule   Take 1 capsule by mouth every morning   Refills:  0       VITAMIN C PO        Dose:  500 mg   Take 500 mg by mouth every morning   Refills:  0       VITAMIN D (CHOLECALCIFEROL) PO        Dose:  1 tablet   Take 1 tablet by mouth every morning   Refills:  0            Where to get your medicines      These medications were sent to [facility] [place], [place] - [address]  [address], [place] [address]     Phone:  [phone]     aspirin  MG EC tablet    senna-docusate 8.6-50 MG per tablet         Some of these will need  a paper prescription and others can be bought over the counter. Ask your nurse if you have questions.     Bring a paper prescription for each of these medications     oxyCODONE 5 MG IR tablet       You don't need a prescription for these medications     cefTRIAXone 1 GM vial                Protect others around you: Learn how to safely use, store and throw away your medicines at www.disposemymeds.org.             Medication List: This is a list of all your medications and when to take them. Check marks below indicate your daily home schedule. Keep this list as a reference.      Medications           Morning Afternoon Evening Bedtime As Needed    ALPHA LIPOIC ACID PO   Take 1 tablet by mouth every morning OTC- unknown strength                                aspirin  MG EC tablet   Take 1 tablet (325 mg) by mouth 2 times daily   Next Dose Due:  7/4/17 7/4/17                       cefTRIAXone 1 GM vial   Commonly known as:  ROCEPHIN   Inject 2 g (2,000 mg) into the vein daily CBC with differential, creatinine, SGOT weekly while on this medication to be faxed to Dr. Benoit office.   Last time this was given:  2 g on 7/3/2017 12:39 AM                                COENZYME Q-10 PO   Take 100 mg by mouth every morning                                MAGNESIUM OXIDE PO   Take 250 mg by mouth every morning   Last time this was given:  200 mg on 7/3/2017  8:20 AM   Next Dose Due:  7/4/17 7/4/17                       multivitamin, therapeutic with minerals Tabs tablet   Take 1 tablet by mouth every morning                                NUTRITIONAL SUPPLEMENT PO   Take 1 tablet by mouth 2 times daily (with meals) (breakfast and dinner)                                oxyCODONE 5 MG IR tablet   Commonly known as:  ROXICODONE   1 tab PO Q4 hours PRN pain 1-5, 2 tab PO Q4 hours PRN pain 6-10   Last time this was given:  5 mg on 7/3/2017 12:30 PM                                   PROBIOTIC PO   Take 1  capsule by mouth every morning                                senna-docusate 8.6-50 MG per tablet   Commonly known as:  SENOKOT-S;PERICOLACE   Take 1-2 tablets by mouth 2 times daily as needed for constipation   Last time this was given:  2 tablets on 7/3/2017  8:21 AM   Next Dose Due:  7/3/17 pm                    7/3/17               TART CHERRY ADVANCED PO   Take 1 capsule by mouth every morning                                VITAMIN C PO   Take 500 mg by mouth every morning                                VITAMIN D (CHOLECALCIFEROL) PO   Take 1 tablet by mouth every morning

## 2017-06-30 NOTE — ANESTHESIA CARE TRANSFER NOTE
Patient: hNan Marc    Procedure(s):  RIGHT TOTAL HIP ARTHROPLASTY REVISION  - Wound Class: I-Clean    Diagnosis: FAILED RIGHT TOTAL HIP   Diagnosis Additional Information: No value filed.    Anesthesia Type:   General, ETT     Note:  Airway :Face Mask  Patient transferred to:PACU  Comments: Patient spontaneously breathing and following commands. VSS. Report given to RN.      Vitals: (Last set prior to Anesthesia Care Transfer)    CRNA VITALS  6/30/2017 1717 - 6/30/2017 1754      6/30/2017             Pulse: 78    SpO2: 90 %    Resp Rate (set): 10                Electronically Signed By: WALDEMAR Jorge CRNA  June 30, 2017  5:54 PM

## 2017-06-30 NOTE — IP AVS SNAPSHOT
"[place] [address] [facility]: [phone]                                              INTERAGENCY TRANSFER FORM - PHYSICIAN ORDERS   2017                    Hospital Admission Date: 2017  SANDY MONTANO   : 1945  Sex: Male        Attending Provider: Selvin Witt MD     Allergies:  No Known Allergies    Infection:  None   Service:  SURGERY    Ht:  1.753 m (5' 9\")   Wt:  86.2 kg (190 lb)   Admission Wt:  86.2 kg (190 lb)    BMI:  28.06 kg/m 2   BSA:  2.05 m 2            Patient PCP Information     Provider PCP Type    RAVI [facility]ANTUMERER [facility]      ED Clinical Impression     Diagnosis Description Comment Added By Time Added    Status post right hip replacement [Z96.641] Status post right hip replacement [Z96.641]  Lexi Okeefe PA-C 2017  6:16 AM    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]  Shashi Zuleta MD 2017  8:04 PM    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 [A49.1] 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 [A49.1]  Shashi Zuleta MD 2017  6:29 PM      Hospital Problems as of 7/3/2017              Priority Class Noted POA    Status post right hip replacement Medium  2017 Yes    * (Principal)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) High  2017 Yes    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   2017 Yes      Non-Hospital Problems as of 7/3/2017              Priority Class Noted    Left hip pain Medium  3/24/2016      Code Status History     Date Active Date Inactive Code Status Order ID Comments User Context    7/3/2017  7:09 AM  Full Code [id number]  Lexi Okeefe PA-C Outpatient    2017  7:18 PM 7/3/2017  7:09 AM Full Code [id number]  Selvin Witt MD Inpatient    3/25/2016  8:15 AM 2017  7:18 PM Full Code [id number]  Lexi Okeefe PA-C Outpatient    3/24/2016  2:32 PM 3/25/2016  8:15 AM " Full Code [id number]  Selvin Witt MD Inpatient         Medication Review      START taking        Dose / Directions Comments    aspirin  MG EC tablet        Dose:  325 mg   Take 1 tablet (325 mg) by mouth 2 times daily   Quantity:  60 tablet   Refills:  0        cefTRIAXone 1 GM vial   Commonly known as:  ROCEPHIN        Dose:  2000 mg   Inject 2 g (2,000 mg) into the vein daily CBC with differential, creatinine, SGOT weekly while on this medication to be faxed to Dr. Benoit office.   Quantity:  600 mL   Refills:  0        oxyCODONE 5 MG IR tablet   Commonly known as:  ROXICODONE        1 tab PO Q4 hours PRN pain 1-5, 2 tab PO Q4 hours PRN pain 6-10   Quantity:  50 tablet   Refills:  0        senna-docusate 8.6-50 MG per tablet   Commonly known as:  SENOKOT-S;PERICOLACE        Dose:  1-2 tablet   Take 1-2 tablets by mouth 2 times daily as needed for constipation   Quantity:  60 tablet   Refills:  0          CONTINUE these medications which have NOT CHANGED        Dose / Directions Comments    ALPHA LIPOIC ACID PO        Dose:  1 tablet   Take 1 tablet by mouth every morning OTC- unknown strength   Refills:  0        COENZYME Q-10 PO        Dose:  100 mg   Take 100 mg by mouth every morning   Refills:  0        MAGNESIUM OXIDE PO        Dose:  250 mg   Take 250 mg by mouth every morning   Refills:  0        multivitamin, therapeutic with minerals Tabs tablet        Dose:  1 tablet   Take 1 tablet by mouth every morning   Refills:  0        NUTRITIONAL SUPPLEMENT PO        Dose:  1 tablet   Take 1 tablet by mouth 2 times daily (with meals) (breakfast and dinner)   Refills:  0        PROBIOTIC PO        Dose:  1 capsule   Take 1 capsule by mouth every morning   Refills:  0        TART CHERRY ADVANCED PO        Dose:  1 capsule   Take 1 capsule by mouth every morning   Refills:  0        VITAMIN C PO        Dose:  500 mg   Take 500 mg by mouth every morning   Refills:  0        VITAMIN D (CHOLECALCIFEROL) PO         Dose:  1 tablet   Take 1 tablet by mouth every morning   Refills:  0                Summary of Visit     Reason for your hospital stay       Removal of total hip replacement and placement of antibiotic spacer, treatment of hip infection             After Care     Activity       Your activity upon discharge: activity as tolerated.  You should work on home exercises provided by the physical therapist at the hospital 2-3 times a day.     Physical Therapy: Once you leave the hospital you will not need outpatient physical therapy for your hip.  You can weight bear as tolerated with a gait aide.  You should avoid bending past 90 degrees.  You should avoid lifting you leg out to the side.    Assistive Ambulatory Devices:  Use your walker/crutches at all times.     Elevate: Swelling in the leg is normal after a hip surgery.  By keeping your leg elevated with a pillow under your calf, not under the knee, will help with the swelling.  The best position is to have the knee above the level of your heart and your ankle above the level of your knee.  Wearing the compression stockings (Mason hose) can help reduce swelling.  You should wear these until you are seen at Dr. Ruiz's office post operatively.  You can remove these twice a day for laundering and hygiene.  The swelling in the leg will be present for at least 4-6 weeks.       Ice: Ice the hip 4-5 times a day for 20-30 minutes at a time.  Icing will help reduce swelling and pain.     Pain control: Take the pain medication and/or anti-inflammatory medication as prescribed.  Don't let your pain become severe.       Diet       Follow this diet upon discharge: Regular       Discharge Instructions       Upon leaving the hospital you will be discharged with a few different medications:     1. Aspirin 325mg - you will be taking one tablet twice a day for 5 weeks following surgery for a blood thinner to help prevent blood clots.   2. Oxycodone 5mg - this is a pain  medication.  You can take one or two tablets every four to six hours.  You will need this medication the longest to sleep at night and for physical therapy.  You can wean yourself from this medication as you are able by either increasing the time between tablets or going down to one tablet if you are taking two at a time.   3. Senokot - this is a stool softener.  You can take one or two pills twice a day as needed for constipation.  You should take this medication as long as you are taking narcotic pain medication and as long as you do not have diarrhea.  As you are more active and taking less pain medication you will be able to stop using this.     Other medications not prescribed:   1. Tylenol - you can take Tylenol in addition to the pain medication.  Do not exceed 3,000mg in a day.   2. Ibuprofen - we would like you to avoid taking ibuprofen while you are taking the aspirin.   3. Iron - we typically do not prescribe an iron supplement pill after surgery however, if you want to take one we recommend 324 or 325mg daily.  These pills will make you more constipated.     Please contact Dr. Ruiz's office with any questions.  You can either call Dr. Joseph Barajas's , at [phone] or email Dr. Joseph Elliott's physician assistant, at [email or web address].       Wound care and dressings       You have an Aquacel dressing in place.  This is a waterproof dressing that does not need to be changed.  It will be removed at your first post operative visit with Dr. Ruiz.  You may get this dressing wet in the shower but do not soak it.  If it starts to come off or becomes saturated call Dr. Ruiz's office for further instructions.             Follow-Up Appointment Instructions     Future Labs/Procedures    Follow-up and recommended labs and tests     Comments:    Your follow-up appointment is schedule for 4:40pm on Wednesday 7/12 at the [place] office with Dr. Ruiz.  Please call [phone] if you  need to reschedule this appointment.     If you have any questions prior to your follow-up appointment please call Dr. Joseph Barajas's , at [phone].  You may also email Lexi with any questions at [email or web address].      Follow-Up Appointment Instructions     Follow-up and recommended labs and tests       Your follow-up appointment is schedule for 4:40pm on Wednesday 7/12 at the [place] office with Dr. Ruiz.  Please call [phone] if you need to reschedule this appointment.     If you have any questions prior to your follow-up appointment please call Dr. Joseph Barajas's , at [phone].  You may also email Lexi with any questions at [email or web address].

## 2017-06-30 NOTE — BRIEF OP NOTE
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   Procedure: Procedure(s):  REMOVAL OF TOTAL HIP ARTHROPLASTY AND PLACEMENT OF ANTIBIOTIC SPACER - Wound Class: IV-Dirty or Infected   Surgeon(s): Surgeon(s) and Role:     * Selvin Witt MD - Primary     * Lexi Okeefe PA-C - Assisting   Estimated blood loss: 2400 mL    Specimens:   ID Type Source Tests Collected by Time Destination   1 :  Wound Other ANAEROBIC BACTERIAL CULTURE, GRAM STAIN, WOUND CULTURE AEROBIC BACTERIAL Selvin Witt MD 6/30/2017  2:39 PM    2 : Deep hip #2  Wound Other ANAEROBIC BACTERIAL CULTURE, GRAM STAIN, WOUND CULTURE AEROBIC BACTERIAL Selvin Witt MD 6/30/2017  2:44 PM    3 : Deep hip # 3 Tissue Other ANAEROBIC BACTERIAL CULTURE, GRAM STAIN, WOUND CULTURE AEROBIC BACTERIAL Selvin Witt MD 6/30/2017  2:57 PM    4 : [place] Wound Hip, Right ANAEROBIC BACTERIAL CULTURE, GRAM STAIN, WOUND CULTURE AEROBIC BACTERIAL Selvin Witt MD 6/30/2017  3:34 PM    5 : [place] #2 Tissue Hip, Right ANAEROBIC BACTERIAL CULTURE, GRAM STAIN, TISSUE CULTURE AEROBIC BACTERIAL Selvin Witt MD 6/30/2017  3:38 PM    A : Deep hip # 1 rule out infection Tissue Other SURGICAL PATHOLOGY EXAM Selvin Witt MD 6/30/2017  2:41 PM    B : Deep Hip #2 Rule out Infection Tissue Hip, Right SURGICAL PATHOLOGY EXAM Selvin Witt MD 6/30/2017  3:02 PM       Findings:  Clinically infected.  Pathology shows acute inflammation.   Resection, placeement ABX spacer.    ID consult

## 2017-06-30 NOTE — IP AVS SNAPSHOT
[address] [facility]    [address] [place] [address]    Phone:  [phone]                                       After Visit Summary   6/30/2017    Nhan Marc    MRN: [id number]           After Visit Summary Signature Page     I have received my discharge instructions, and my questions have been answered. I have discussed any challenges I see with this plan with the nurse or doctor.    ..........................................................................................................................................  Patient/Patient Representative Signature      ..........................................................................................................................................  Patient Representative Print Name and Relationship to Patient    ..................................................               ................................................  Date                                            Time    ..........................................................................................................................................  Reviewed by Signature/Title    ...................................................              ..............................................  Date                                                            Time

## 2017-06-30 NOTE — OR NURSING
Dr. Navarro notified of Hgb results of 9.0.  No new orders.  Continue to monitor.  1900:  DC criteria met.  Per Dr. Navarro ok to transfer to station [id number]

## 2017-07-01 ENCOUNTER — APPOINTMENT (OUTPATIENT)
Dept: PHYSICAL THERAPY | Facility: CLINIC | Age: 72
DRG: 464 | End: 2017-07-01
Attending: ORTHOPAEDIC SURGERY
Payer: MEDICARE

## 2017-07-01 ENCOUNTER — APPOINTMENT (OUTPATIENT)
Dept: OCCUPATIONAL THERAPY | Facility: CLINIC | Age: 72
DRG: 464 | End: 2017-07-01
Attending: ORTHOPAEDIC SURGERY
Payer: MEDICARE

## 2017-07-01 PROBLEM — T84.51XA INFECTION OF RIGHT PROSTHETIC HIP JOINT (H): Status: ACTIVE | Noted: 2017-07-01

## 2017-07-01 LAB
ANION GAP SERPL CALCULATED.3IONS-SCNC: 8 MMOL/L (ref 3–14)
BUN SERPL-MCNC: 17 MG/DL (ref 7–30)
CALCIUM SERPL-MCNC: 7.6 MG/DL (ref 8.5–10.1)
CHLORIDE SERPL-SCNC: 105 MMOL/L (ref 94–109)
CO2 BLDCOV-SCNC: 24 MMOL/L (ref 21–28)
CO2 SERPL-SCNC: 24 MMOL/L (ref 20–32)
CREAT SERPL-MCNC: 0.85 MG/DL (ref 0.66–1.25)
GFR SERPL CREATININE-BSD FRML MDRD: 88 ML/MIN/1.7M2
GLUCOSE SERPL-MCNC: 128 MG/DL (ref 70–99)
GRAM STN SPEC: NORMAL
HCT VFR BLD CALC: 31 %PCV (ref 40–53)
HGB BLD CALC-MCNC: 10.5 G/DL (ref 13.3–17.7)
HGB BLD-MCNC: 7.2 G/DL (ref 13.3–17.7)
HGB BLD-MCNC: 7.4 G/DL (ref 13.3–17.7)
MICRO REPORT STATUS: NORMAL
PCO2 BLDV: 45 MM HG (ref 40–50)
PH BLDV: 7.33 PH (ref 7.32–7.43)
PO2 BLDV: 51 MM HG (ref 25–47)
POTASSIUM BLD-SCNC: 4.1 MMOL/L (ref 3.4–5.3)
POTASSIUM SERPL-SCNC: 4.2 MMOL/L (ref 3.4–5.3)
SAO2 % BLDV FROM PO2: 83 %
SODIUM BLD-SCNC: 139 MMOL/L (ref 133–144)
SODIUM SERPL-SCNC: 137 MMOL/L (ref 133–144)
SPECIMEN SOURCE: NORMAL

## 2017-07-01 PROCEDURE — 80048 BASIC METABOLIC PNL TOTAL CA: CPT | Performed by: ORTHOPAEDIC SURGERY

## 2017-07-01 PROCEDURE — 40000193 ZZH STATISTIC PT WARD VISIT: Performed by: PHYSICAL THERAPIST

## 2017-07-01 PROCEDURE — 97116 GAIT TRAINING THERAPY: CPT | Mod: GP | Performed by: PHYSICAL THERAPIST

## 2017-07-01 PROCEDURE — 25000128 H RX IP 250 OP 636: Performed by: ORTHOPAEDIC SURGERY

## 2017-07-01 PROCEDURE — A9270 NON-COVERED ITEM OR SERVICE: HCPCS | Mod: GY | Performed by: ORTHOPAEDIC SURGERY

## 2017-07-01 PROCEDURE — 85018 HEMOGLOBIN: CPT | Performed by: INTERNAL MEDICINE

## 2017-07-01 PROCEDURE — 97165 OT EVAL LOW COMPLEX 30 MIN: CPT | Mod: GO | Performed by: OCCUPATIONAL THERAPIST

## 2017-07-01 PROCEDURE — 97530 THERAPEUTIC ACTIVITIES: CPT | Mod: GP | Performed by: PHYSICAL THERAPIST

## 2017-07-01 PROCEDURE — 99233 SBSQ HOSP IP/OBS HIGH 50: CPT | Performed by: INTERNAL MEDICINE

## 2017-07-01 PROCEDURE — 97110 THERAPEUTIC EXERCISES: CPT | Mod: GP | Performed by: PHYSICAL THERAPIST

## 2017-07-01 PROCEDURE — 85018 HEMOGLOBIN: CPT | Performed by: ORTHOPAEDIC SURGERY

## 2017-07-01 PROCEDURE — 97535 SELF CARE MNGMENT TRAINING: CPT | Mod: GO | Performed by: OCCUPATIONAL THERAPIST

## 2017-07-01 PROCEDURE — 40000133 ZZH STATISTIC OT WARD VISIT: Performed by: OCCUPATIONAL THERAPIST

## 2017-07-01 PROCEDURE — 25000128 H RX IP 250 OP 636: Performed by: PHYSICIAN ASSISTANT

## 2017-07-01 PROCEDURE — 97530 THERAPEUTIC ACTIVITIES: CPT | Mod: GO | Performed by: OCCUPATIONAL THERAPIST

## 2017-07-01 PROCEDURE — 36415 COLL VENOUS BLD VENIPUNCTURE: CPT | Performed by: INTERNAL MEDICINE

## 2017-07-01 PROCEDURE — 25000132 ZZH RX MED GY IP 250 OP 250 PS 637: Mod: GY | Performed by: ORTHOPAEDIC SURGERY

## 2017-07-01 PROCEDURE — 25000132 ZZH RX MED GY IP 250 OP 250 PS 637: Mod: GY | Performed by: INTERNAL MEDICINE

## 2017-07-01 PROCEDURE — 36415 COLL VENOUS BLD VENIPUNCTURE: CPT | Performed by: ORTHOPAEDIC SURGERY

## 2017-07-01 PROCEDURE — 97161 PT EVAL LOW COMPLEX 20 MIN: CPT | Mod: GP | Performed by: PHYSICAL THERAPIST

## 2017-07-01 PROCEDURE — 12000007 ZZH R&B INTERMEDIATE

## 2017-07-01 PROCEDURE — 25000128 H RX IP 250 OP 636

## 2017-07-01 RX ORDER — OXYCODONE HYDROCHLORIDE 5 MG/1
TABLET ORAL
Qty: 50 TABLET | Refills: 0 | Status: ON HOLD | OUTPATIENT
Start: 2017-07-01 | End: 2017-09-06

## 2017-07-01 RX ORDER — AMOXICILLIN 250 MG
1-2 CAPSULE ORAL 2 TIMES DAILY PRN
Qty: 60 TABLET | Refills: 0 | Status: ON HOLD | OUTPATIENT
Start: 2017-07-01 | End: 2017-09-06

## 2017-07-01 RX ORDER — LACTOBACILLUS RHAMNOSUS GG 10B CELL
1 CAPSULE ORAL EVERY MORNING
Status: DISCONTINUED | OUTPATIENT
Start: 2017-07-01 | End: 2017-07-03 | Stop reason: HOSPADM

## 2017-07-01 RX ORDER — CEFAZOLIN SODIUM 1 G/50ML
1250 SOLUTION INTRAVENOUS EVERY 12 HOURS
Status: DISCONTINUED | OUTPATIENT
Start: 2017-07-01 | End: 2017-07-02 | Stop reason: CLARIF

## 2017-07-01 RX ORDER — CEFTRIAXONE 2 G/1
2 INJECTION, POWDER, FOR SOLUTION INTRAMUSCULAR; INTRAVENOUS EVERY 24 HOURS
Status: DISCONTINUED | OUTPATIENT
Start: 2017-07-02 | End: 2017-07-03 | Stop reason: HOSPADM

## 2017-07-01 RX ADMIN — CEFAZOLIN SODIUM 2 G: 2 INJECTION, SOLUTION INTRAVENOUS at 00:05

## 2017-07-01 RX ADMIN — KETOROLAC TROMETHAMINE 15 MG: 15 INJECTION, SOLUTION INTRAMUSCULAR; INTRAVENOUS at 02:10

## 2017-07-01 RX ADMIN — VANCOMYCIN HYDROCHLORIDE 1250 MG: 5 INJECTION, POWDER, LYOPHILIZED, FOR SOLUTION INTRAVENOUS at 22:23

## 2017-07-01 RX ADMIN — Medication 1 CAPSULE: at 14:51

## 2017-07-01 RX ADMIN — CEFAZOLIN SODIUM 2 G: 2 INJECTION, SOLUTION INTRAVENOUS at 08:03

## 2017-07-01 RX ADMIN — SENNOSIDES AND DOCUSATE SODIUM 2 TABLET: 8.6; 5 TABLET ORAL at 20:42

## 2017-07-01 RX ADMIN — SODIUM CHLORIDE: 9 INJECTION, SOLUTION INTRAVENOUS at 05:51

## 2017-07-01 RX ADMIN — OXYCODONE HYDROCHLORIDE 5 MG: 5 TABLET ORAL at 08:07

## 2017-07-01 RX ADMIN — ACETAMINOPHEN 975 MG: 325 TABLET, FILM COATED ORAL at 05:51

## 2017-07-01 RX ADMIN — ACETAMINOPHEN 975 MG: 325 TABLET, FILM COATED ORAL at 14:51

## 2017-07-01 RX ADMIN — SENNOSIDES AND DOCUSATE SODIUM 2 TABLET: 8.6; 5 TABLET ORAL at 08:03

## 2017-07-01 RX ADMIN — KETOROLAC TROMETHAMINE 15 MG: 15 INJECTION, SOLUTION INTRAMUSCULAR; INTRAVENOUS at 08:02

## 2017-07-01 RX ADMIN — OXYCODONE HYDROCHLORIDE 10 MG: 5 TABLET ORAL at 12:44

## 2017-07-01 RX ADMIN — CYCLOBENZAPRINE HYDROCHLORIDE 5 MG: 5 TABLET, FILM COATED ORAL at 16:38

## 2017-07-01 RX ADMIN — VANCOMYCIN HYDROCHLORIDE 1250 MG: 5 INJECTION, POWDER, LYOPHILIZED, FOR SOLUTION INTRAVENOUS at 10:18

## 2017-07-01 RX ADMIN — ACETAMINOPHEN 975 MG: 325 TABLET, FILM COATED ORAL at 22:24

## 2017-07-01 RX ADMIN — KETOROLAC TROMETHAMINE 15 MG: 15 INJECTION, SOLUTION INTRAMUSCULAR; INTRAVENOUS at 14:51

## 2017-07-01 RX ADMIN — ENOXAPARIN SODIUM 40 MG: 40 INJECTION SUBCUTANEOUS at 14:51

## 2017-07-01 ASSESSMENT — ACTIVITIES OF DAILY LIVING (ADL)
IADL_COMMENTS: SPOUSE CAN ASSIST PRN
PREVIOUS_RESPONSIBILITIES: HOUSEKEEPING;LAUNDRY;SHOPPING;YARDWORK;MEDICATION MANAGEMENT;FINANCES;DRIVING

## 2017-07-01 NOTE — PLAN OF CARE
Problem: Goal Outcome Summary  Goal: Goal Outcome Summary  Outcome: Improving  A/OX4,cms intact.Denies pain.Getting schedule toradol.IVF infusing.No nausea.Gordon to be DC this morning and he will be DTV.Dreg CDI.Dangled last night.Turned and repositioned between cares.On reg diet.Will continue to monitor.

## 2017-07-01 NOTE — PROGRESS NOTES
07/01/17 1100   Quick Adds   Type of Visit Initial Occupational Therapy Evaluation   Living Environment   Lives With spouse   Living Arrangements house   Number of Stairs to Enter Home 2   Number of Stairs Within Home 10   Transportation Available car;family or friend will provide   Living Environment Comment Pt lives with spouse in a house where all needs are met on main level   Self-Care   Usual Activity Tolerance good   Current Activity Tolerance moderate   Regular Exercise no   Equipment Currently Used at Home walker, rolling   Activity/Exercise/Self-Care Comment Pt has been limited to household distances due to pain; recently retired as hockey    Functional Level Prior   Ambulation 1-->assistive equipment   Transferring 1-->assistive equipment   Toileting 0-->independent   Bathing 0-->independent   Dressing 0-->independent   Eating 0-->independent   Communication 0-->understands/communicates without difficulty   Swallowing 0-->swallows foods/liquids without difficulty   Cognition 0 - no cognition issues reported   Fall history within last six months no   Which of the above functional risks had a recent onset or change? ambulation;transferring   Prior Functional Level Comment Pt reports being (I) with all ADLs, but using FWW for approximately 1 month prior to surgery secondary to pain.    General Information   Onset of Illness/Injury or Date of Surgery - Date 06/30/17   Referring Physician Dr. May   Patient/Family Goals Statement Go home with spouse to assist   Additional Occupational Profile Info/Pertinent History of Current Problem Pt is s/p removal of R MARY and placement of antibiotic spacer.  (Ozzy-lateral approach; MD orders state no hip precautions)   Precautions/Limitations fall precautions;right hip precautions  (ozzy-lateral approach: OT order states no hip precautions)   Weight-Bearing Status - RLE weight-bearing as tolerated   General Observations Pt supine; spouse present; pt agreeable  to OT session; spouse nervous about precautions   General Info Comments Activity order: ambulate with assist 3x daily   Cognitive Status Examination   Cognitive Comment No cognitive concerns   Visual Perception   Visual Perception Wears glasses   Sensory Examination   Sensory Comments Denies N/T   Pain Assessment   Patient Currently in Pain No   Integumentary/Edema   Integumentary/Edema Comments Incision covered with post-op dressing   Range of Motion (ROM)   ROM Comment Pt demonstrated functional BUE AROM with bed mobility, transfers, walker use   Strength   Strength Comments Pt demonstrated functional BUE strength with bed mobility, transfers, walker use   Coordination   Upper Extremity Coordination No deficits were identified   Mobility   Bed Mobility Bed mobility skill: Sit to supine;Bed mobility skill: Supine to sit   Bed Mobility Skill: Sit to Supine   Level of [place]: Sit/Supine stand-by assist   Physical Assist/Nonphysical Assist: Sit/Supine verbal cues;supervision;set-up required   Bed Mobility Skill: Supine to Sit   Level of [place]: Supine/Sit stand-by assist   Physical Assist/Nonphysical Assist: Supine/Sit verbal cues;supervision;set-up required   Transfer Skill: Sit to Stand   Level of [place]: Sit/Stand stand-by assist   Physical Assist/Nonphysical Assist: Sit/Stand verbal cues;supervision;set-up required   Transfer Skill: Sit to Stand weight-bearing as tolerated   Assistive Device for Transfer: Sit/Stand rolling walker   Transfer Skill: Toilet Transfer   Level of [place]: Toilet stand-by assist   Physical Assist/Nonphysical Assist: Toilet verbal cues;supervision;set-up required   Weight-Bearing Restrictions: Toilet weight-bearing as tolerated   Assistive Device rolling walker;seat riser;grab bars   Toilet Transfer Skill Comments Pt has low toilet at home with support on one side only; will need raise and B support to keep hip precautions   Tub/Shower Transfer   Tub/Shower Transfer  "Comments Pt has walk-in shower   Balance   Balance Comments Good sitting balance; no LOB throughout session   Lower Body Dressing   Level of [place]: Dress Lower Body minimum assist (75% patients effort)   Physical Assist/Nonphysical Assist: Dress Lower Body set-up required;supervision;verbal cues;1 person assist   Assistive Device reacher;sock-aid   Instrumental Activities of Daily Living (IADL)   Previous Responsibilities housekeeping;laundry;shopping;yardwork;medication management;finances;driving   IADL Comments spouse can assist prn   Activities of Daily Living Analysis   Impairments Contributing to Impaired Activities of Daily Living pain;post surgical precautions   General Therapy Interventions   Planned Therapy Interventions ADL retraining;transfer training   Clinical Impression   Criteria for Skilled Therapeutic Interventions Met yes, treatment indicated   OT Diagnosis impaired ADLs   Influenced by the following impairments pain, post-surgical precautions, post-op weakness   Assessment of Occupational Performance 1-3 Performance Deficits   Identified Performance Deficits LE dressing, toilet transfer, tub/shower transfer   Clinical Decision Making (Complexity) Low complexity   Therapy Frequency daily   Predicted Duration of Therapy Intervention (days/wks) 3 days   Anticipated Equipment Needs at Discharge bedside commode;reacher;sock aide   Anticipated Discharge Disposition Home with Assist   Risks and Benefits of Treatment have been explained. Yes   Patient, Family & other staff in agreement with plan of care Yes   Clinical Impression Comments Skilled OT intervention recommended while hospitalized to assist patient with return to PLOF/achieve maximum ADL independence, as well as assist with DC planning/AE/DME needs.   [facility] AM-PAC  \"6 Clicks\" Daily Activity Inpatient Short Form   1. Putting on and taking off regular lower body clothing? 3 - A Little   2. Bathing (including washing, rinsing, " drying)? 3 - A Little   3. Toileting, which includes using toilet, bedpan or urinal? 3 - A Little   4. Putting on and taking off regular upper body clothing? 4 - None   5. Taking care of personal grooming such as brushing teeth? 4 - None   6. Eating meals? 4 - None   Daily Activity Raw Score (Score out of 24.Lower scores equate to lower levels of function) 21   Total Evaluation Time   Total Evaluation Time (Minutes) 8

## 2017-07-01 NOTE — PHARMACY-VANCOMYCIN DOSING SERVICE
Pharmacy Vancomycin Initial Note  Date of Service 2017  Patient's  1945  72 year old, male    Indication: Postoperative Infection    Current estimated CrCl = Estimated Creatinine Clearance: 82.9 mL/min (based on Cr of 0.85).    Creatinine for last 3 days  2017: 12:18 PM Creatinine 0.68 mg/dL  2017:  6:47 AM Creatinine 0.85 mg/dL    Recent Vancomycin Level(s) for last 3 days  No results found for requested labs within last 72 hours.      Vancomycin IV Administrations (past 72 hours)      No vancomycin orders with administrations in past 72 hours.                Nephrotoxins and other renal medications (Future)    Start     Dose/Rate Route Frequency Ordered Stop    17 0800  vancomycin 1250 mg in 0.9% NaCl 250 mL PREMIX      1,250 mg Intravenous EVERY 12 HOURS 17 0719      17 193  ketorolac (TORADOL) injection 15 mg     Comments:  IF celecoxib was given pre-operatively, start ketorolac 12 hours after celecoxib given.    15 mg Intravenous EVERY 6 HOURS 17 1918 17          Contrast Orders - past 72 hours     None                Plan:  1.  Start vancomycin  1250 mg IV q12h.   2.  Goal Trough Level: pending ID consult opinion   3.  Pharmacy will check trough levels as appropriate in 1-3 Days.    4. Serum creatinine levels will be ordered a minimum of twice weekly.    5. [place] method utilized to dose vancomycin therapy: Method 1    Cleopatra Calderon

## 2017-07-01 NOTE — CONSULTS
"[facility]    Hospitalist Consultation    Date of Admission:  6/30/2017  Date of Consult (When I saw the patient): 06/30/17    Assessment & Plan   Nhan Marc is a 72 year old male who was admitted on 6/30/2017. I was asked to see the patient for \"hospitalist consult\".    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 Patient underwent surgery earlier today with Dr. Witt. Patient was under general anesthesia with an EBL of 2400 cc. Will defer postoperative cares including IV fluids, DVT prophylaxis and pain control to orthopedic surgery. Infectious Disease has been consulted for further antibiotic guidance. Hip cultures pending.    Right foot pain. Patient has had four days of edematous right foot. Reportedly red earlier this AM but has since returned to normal. Patient feels this may be gout despite limited pain. Will observe overnight. No obvious tenderness to palpation and no erythema. Low suspicion for infection. Keep elevated as able and apply ice as needed.    Acute blood loss anemia. Patient had EBL of 2400 cc intraoperatively. Postoperative hemoglobin 9.0. Recheck tomorrow AM    DVT Prophylaxis: Defer to primary service  Code Status: Full Code    Disposition: Expected discharge in >2 days once safe dispo plan.    Nicoel Lopez PA-C    The patient was discussed with Dr. Beaver who agrees with the plan as outlined above.    Reason for Consult   Reason for consult: I was asked by Dr. Witt to evaluate this patient for \"hospitalist consult\".    Primary Care Physician   RAVI KC    Chief Complaint   Hip pain    History is obtained from the patient    History of Present Illness   Nhan Marc is a 72 year old male who present is status post right total hip arthroplasty revision with antibiotic spacer. Patient had right total hip arthroplasty approximatley 3 years ago. Over the past month he has been " limping in his right lower extremity and has had intermittent pain. There was the concern for possible infected joint so patient underwent the above procedure today. Patient was under general anesthesia with an EBL of 2400 cc. Orthopedics felt patients joint was infected.    I met with the patient postoperatively. He notes he has had four days of swelling in his right foot with mild erythema. His erythema has resolved since surgery. He hasn't had significant pain. Has a history of gout and notes he thinks this is likely gout despite his lack of pain. Otherwise denies chest pain, shortness of breath, abdominal pain.    Past Medical History    I have reviewed this patient's medical history and updated it with pertinent information if needed.   Past Medical History:   Diagnosis Date     Allergic rhinitis      Arthritis      Gout      Squamous cell cancer of external ear        Past Surgical History   I have reviewed this patient's surgical history and updated it with pertinent information if needed.  Past Surgical History:   Procedure Laterality Date     ARTHROPLASTY HIP ANTERIOR Left 3/24/2016    Procedure: ARTHROPLASTY HIP ANTERIOR;  Surgeon: Selvin Witt MD;  Location: [facility] OR     ARTHROSCOPY KNEE       C TOTAL HIP ARTHROPLASTY  06-    right hip 2013, left hip 2017     VASECTOMY  1979       Prior to Admission Medications   Prior to Admission Medications   Prescriptions Last Dose Informant Patient Reported? Taking?   ALPHA LIPOIC ACID PO 6/20/2017 at am Self Yes Yes   Sig: Take 1 tablet by mouth every morning OTC- unknown strength   Ascorbic Acid (VITAMIN C PO) 6/20/2017 at am Self Yes Yes   Sig: Take 500 mg by mouth every morning   COENZYME Q-10 PO 6/20/2017 at am Self Yes Yes   Sig: Take 100 mg by mouth every morning    MAGNESIUM OXIDE PO 6/20/2017 at am Self Yes Yes   Sig: Take 250 mg by mouth every morning   Misc Natural Products (TART CHERRY ADVANCED PO) 6/20/2017 at am Self Yes Yes   Sig: Take  1 capsule by mouth every morning   Nutritional Supplements (NUTRITIONAL SUPPLEMENT PO) 6/20/2017 at pm Self Yes Yes   Sig: Take 1 tablet by mouth 2 times daily (with meals) (breakfast and dinner)   Probiotic Product (PROBIOTIC PO) Over 2 months ago at am Self Yes Yes   Sig: Take 1 capsule by mouth every morning    VITAMIN D, CHOLECALCIFEROL, PO 6/20/2017 at am Self Yes Yes   Sig: Take 1 tablet by mouth every morning   multivitamin, therapeutic with minerals (THERA-VIT-M) TABS 6/20/2017 at am Self Yes Yes   Sig: Take 1 tablet by mouth every morning       Facility-Administered Medications: None     Allergies   No Known Allergies    Social History   I have reviewed this patient's social history and updated it with pertinent information if needed. Nhan Allan Monico  reports that he has never smoked. He does not have any smokeless tobacco history on file. He reports that he does not drink alcohol or use illicit drugs.    Family History   I have reviewed this patient's family history and updated it with pertinent information if needed.   History reviewed. No pertinent family history.    Review of Systems   The 10 point Review of Systems is negative other than noted in the HPI    Physical Exam   Temp: 97.7  F (36.5  C) Temp src: Oral BP: 129/61 Pulse: 69 Heart Rate: 82 Resp: 16 SpO2: 99 % O2 Device: Nasal cannula Oxygen Delivery: 2 LPM  Vital Signs with Ranges  Temp:  [96.9  F (36.1  C)-98.1  F (36.7  C)] 97.7  F (36.5  C)  Pulse:  [69] 69  Heart Rate:  [69-86] 82  Resp:  [12-16] 16  BP: (125-159)/(61-98) 129/61  SpO2:  [98 %-100 %] 99 %  190 lbs 0 oz    Constitutional: Well appearing male  HEENT: Equal and reactive to light. Mucous membranes moist  Respiratory: CTAB  Cardiovascular: RRR  GI: Soft, non-tender, normal active bowel sounds.  Skin: Warm, dry. +1 edema over dorsal aspect of right foot with no extension into ankle-no erythema.  Musculoskeletal: no gross deformities.  Neurologic: oriented x3  Psychiatric: calm,  cooperative.    Data   -Data reviewed today: All pertinent laboratory and imaging results from this encounter were reviewed. I personally reviewed no images or EKG's today.    Recent Labs  Lab 06/30/17  1811 06/30/17  1218   HGB 9.0* 12.6*   PLT  --  329   CR  --  0.68       No results found for this or any previous visit (from the past 24 hour(s)).

## 2017-07-01 NOTE — ANESTHESIA POSTPROCEDURE EVALUATION
Patient: Nhan Marc    Procedure(s):  REMOVAL OF TOTAL HIP ARTHROPLASTY AND PLACEMENT OF ANTIBIOTIC SPACER - Wound Class: IV-Dirty or Infected    Diagnosis:FAILED RIGHT TOTAL HIP   Diagnosis Additional Information: No value filed.    Anesthesia Type:  General, ETT    Note:  Anesthesia Post Evaluation    Patient location during evaluation: PACU  Patient participation: Able to fully participate in evaluation  Level of consciousness: awake  Pain management: adequate  Airway patency: patent  Cardiovascular status: acceptable  Respiratory status: acceptable  Hydration status: acceptable  PONV: controlled     Anesthetic complications: None          Last vitals:  Vitals:    06/30/17 1850 06/30/17 1900 06/30/17 1925   BP: 147/71 125/72 129/61   Pulse:      Resp: 12 14 16   Temp:  36.3  C (97.3  F) 36.5  C (97.7  F)   SpO2: 100% 100% 99%         Electronically Signed By: Dione Navarro MD  June 30, 2017  7:45 PM

## 2017-07-01 NOTE — PLAN OF CARE
"Problem: Goal Outcome Summary  Goal: Goal Outcome Summary  PT: Orders received, evaluation completed, and treatment initiated. Patient is a 73 y/o male POD # 1 removal of R MARY and placement of antibiotic spacer. Patient lives with his spouse, 2 stairs to enter/exit and 10 stairs to access basement. Patient reports utilizing FWW prior to surgery secondary to pain.      Sticky noted entered for MD to clarify hip precautions. PT order states: \"No hip precautions or restrictions.\" and \"my-lateral hip precautions\" order entered.      Discharge Planner PT   Patient plan for discharge: Return home with assist from spouse.      Current status: Patient performed supine-sit, Homero to guide R LE off EOB. Sit <> stand and ambulation of 200 feet with FWW and CGA. Patient tolerated supine strengthening/ROM exercises.      Barriers to return to prior living situation: Stairs to enter/exit-not yet assessed     Recommendations for discharge: TBD on POD # 2.      Rationale for recommendations: TBD on POD # 2.        Entered by: Shwetha Keating 07/01/2017 8:53 AM             "

## 2017-07-01 NOTE — PROGRESS NOTES
"Orthopedic Surgery  7/1/2017  POD 1    S: Patient voices no complaints today.     O: Blood pressure 104/48, pulse 69, temperature 97.6  F (36.4  C), temperature source Oral, resp. rate 16, height 1.753 m (5' 9\"), weight 86.2 kg (190 lb), SpO2 100 %.  Lab Results   Component Value Date    HGB 9.0 06/30/2017     Neurovascularly intact.  Calves are negative bilaterally, both soft and nontender.  The dressing is C/D/I.    A: Mr. Marc is doing well status post Procedure(s):  COMBINED REMOVE HARDWARE ARTHROPLASTY HIP, IRRIGATION AND DEBRIDEMENT, PLACE ANTIBIOTIC CEMENT BEADS / SPACER.    P:   1. Physical therapy.   2. Social work to see for discharge planning.  3. ID to see for antibiotic recommendations.  4. Anticipate discharge in 2-3 days pending ID consult & antibiotic recommendation.    Selvin Ruiz  [phone]  "

## 2017-07-01 NOTE — PLAN OF CARE
"Problem: Goal Outcome Summary  Goal: Goal Outcome Summary  OT order received.  Evaluation completed, treatment initiated.  Patient is a 72 year old male who is s/p removal of R MARY and placement of antibiotic spacer. He lives with his spouse in a home where all needs are met on the main level.  Patient instructed in my-lateral hip precautions, but awaiting clarification from surgeon as the OT order states \"No hip precautions or restrictions\".  Patient was limited in mobility by pain, but was completing all ADLs independently prior to surgery.     Discharge Planner OT   Patient plan for discharge: home with spouse to assist  Current status: Malika LE dressing with AE; SBA toilet transfer to commode overlay; SBA shower transfer  Barriers to return to prior living situation: none  Recommendations for discharge: home with spouse to assist  Rationale for recommendations: home with spouse to assist       Entered by: TANI RUEDA 07/01/2017 12:29 PM         "

## 2017-07-01 NOTE — PROGRESS NOTES
07/01/17 0805   Quick Adds   Type of Visit Initial PT Evaluation   Living Environment   Lives With spouse  ([place])   Living Arrangements house   Number of Stairs to Enter Home 2  (No railing. )   Number of Stairs Within Home 10  (1 railing with landing between 5 stairs)   Transportation Available car;family or friend will provide  (Pt drove prior to hospitalization. )   Living Environment Comment Pt's spouse is able to assist at time of disharge.    Self-Care   Usual Activity Tolerance good  (Limited by pain. )   Current Activity Tolerance moderate   Regular Exercise no   Equipment Currently Used at Home walker, rolling   Activity/Exercise/Self-Care Comment Pt owns FWW.    Functional Level Prior   Ambulation 1-->assistive equipment   Transferring 1-->assistive equipment   Fall history within last six months no   Prior Functional Level Comment Pt reports using FWW for approximately 1 month prior to surgery secondary to pain.    General Information   Onset of Illness/Injury or Date of Surgery - Date 06/30/17   Referring Physician Selvin Witt MD   Patient/Family Goals Statement Return home.    Pertinent History of Current Problem (include personal factors and/or comorbidities that impact the POC) 73 y/o male POD # 1 removal of R MARY and placement of antibiotic spacer.    Precautions/Limitations fall precautions;right hip precautions  (Ozzy-lateral precautions)   Weight-Bearing Status - RLE weight-bearing as tolerated   General Observations Pt in supine upon arrival of therapist.    General Info Comments Ambulate with assist. Awaiting clarification of hip precautions noted order for A-L precautions as well as no precautions or restrictions. Entered sticky note to surgeon.    Cognitive Status Examination   Orientation orientation to person, place and time   Level of Consciousness alert   Pain Assessment   Patient Currently in Pain (R hip discomfort noted with movement. )   Integumentary/Edema  "  Integumentary/Edema Comments R hip incision covered with dressing, hemovac intact.    Posture    Posture Comments No deficits noted.    Range of Motion (ROM)   ROM Comment Limited R hip ROM secondary to pain and hip precautions, otherwise B LEs WFL.    Strength   Strength Comments Not formally assessed. Pt demonstrates at least 3/5 grossly in B LEs with functional mobility.    Bed Mobility   Bed Mobility Comments Supine-sit, Homero.    Transfer Skills   Transfer Comments Sit <> stand with FWW and CGA.    Gait   Gait Comments Pt amb 10' with FWW and CGA.    Balance   Balance Comments Noted good sitting and standing balance at FWW.    Sensory Examination   Sensory Perception Comments Pt denied numbness/tingling in B LEs.    Modality Interventions   Planned Modality Interventions Cryotherapy   Planned Modality Interventions Comments PRN   General Therapy Interventions   Planned Therapy Interventions bed mobility training;gait training;ROM;strengthening;transfer training   Clinical Impression   Criteria for Skilled Therapeutic Intervention yes, treatment indicated   PT Diagnosis Difficulty with gait.    Influenced by the following impairments Pain, Impaired R hip ROM, Decreased strength, Decreased activity tolerance   Functional limitations due to impairments Limited functional mobility requiring AD and assist.    Clinical Presentation Evolving/Changing   Clinical Presentation Rationale Based on PMH, current presentation, and social support.    Clinical Decision Making (Complexity) Low complexity   Therapy Frequency` 2 times/day   Predicted Duration of Therapy Intervention (days/wks) 3 days   Anticipated Discharge Disposition Home with Assist   Risk & Benefits of therapy have been explained Yes   Patient, Family & other staff in agreement with plan of care Yes   [facility] AM-PAC  \"6 Clicks\" V.2 Basic Mobility Inpatient Short Form   1. Turning from your back to your side while in a flat bed without using " bedrails? 4 - None   2. Moving from lying on your back to sitting on the side of a flat bed without using bedrails? 3 - A Little   3. Moving to and from a bed to a chair (including a wheelchair)? 3 - A Little   4. Standing up from a chair using your arms (e.g., wheelchair, or bedside chair)? 3 - A Little   5. To walk in hospital room? 3 - A Little   6. Climbing 3-5 steps with a railing? 2 - A Lot   Basic Mobility Raw Score (Score out of 24.Lower scores equate to lower levels of function) 18   Total Evaluation Time   Total Evaluation Time (Minutes) 8

## 2017-07-01 NOTE — PLAN OF CARE
Problem: Goal Outcome Summary  Goal: Goal Outcome Summary  Outcome: Improving  Patient up to floor at 1920.  Patient sat at edge of bed with assist of 1; tolerated well, denied nausea or lightheadedness.  Patient denies pain; given scheduled Toradol and Tylenol.  VSS.  A/Ox4.  Dressing CDI.  CMS intact.  Moderate edema to R ankle.  Gordon and hemovac intact and patent.  1.5L NC.

## 2017-07-01 NOTE — PLAN OF CARE
Problem: Goal Outcome Summary  Goal: Goal Outcome Summary  Outcome: Improving  Pain managed with PRN Oxycodone with scheduled Tordol and Tylenol, up with one assist and walker, voiding via urinal, IVF's were SL'd, and patient is progressing toward plan of care.

## 2017-07-02 ENCOUNTER — APPOINTMENT (OUTPATIENT)
Dept: PHYSICAL THERAPY | Facility: CLINIC | Age: 72
DRG: 464 | End: 2017-07-02
Attending: ORTHOPAEDIC SURGERY
Payer: MEDICARE

## 2017-07-02 ENCOUNTER — APPOINTMENT (OUTPATIENT)
Dept: OCCUPATIONAL THERAPY | Facility: CLINIC | Age: 72
DRG: 464 | End: 2017-07-02
Attending: ORTHOPAEDIC SURGERY
Payer: MEDICARE

## 2017-07-02 PROBLEM — A49.1 INFECTION DUE TO BETA-HEMOLYTIC STREPTOCOCCUS: Status: ACTIVE | Noted: 2017-07-02

## 2017-07-02 LAB
ERYTHROCYTE [DISTWIDTH] IN BLOOD BY AUTOMATED COUNT: 15.3 % (ref 10–15)
GLUCOSE SERPL-MCNC: 105 MG/DL (ref 70–99)
HCT VFR BLD AUTO: 20.8 % (ref 40–53)
HGB BLD-MCNC: 6.9 G/DL (ref 13.3–17.7)
HGB BLD-MCNC: 7 G/DL (ref 13.3–17.7)
MCH RBC QN AUTO: 28.6 PG (ref 26.5–33)
MCHC RBC AUTO-ENTMCNC: 33.2 G/DL (ref 31.5–36.5)
MCV RBC AUTO: 86 FL (ref 78–100)
PLATELET # BLD AUTO: 221 10E9/L (ref 150–450)
RBC # BLD AUTO: 2.41 10E12/L (ref 4.4–5.9)
WBC # BLD AUTO: 9.3 10E9/L (ref 4–11)

## 2017-07-02 PROCEDURE — A9270 NON-COVERED ITEM OR SERVICE: HCPCS | Mod: GY | Performed by: ORTHOPAEDIC SURGERY

## 2017-07-02 PROCEDURE — 25000128 H RX IP 250 OP 636: Performed by: INTERNAL MEDICINE

## 2017-07-02 PROCEDURE — 12000007 ZZH R&B INTERMEDIATE

## 2017-07-02 PROCEDURE — 85018 HEMOGLOBIN: CPT | Performed by: INTERNAL MEDICINE

## 2017-07-02 PROCEDURE — 25000132 ZZH RX MED GY IP 250 OP 250 PS 637: Mod: GY | Performed by: ORTHOPAEDIC SURGERY

## 2017-07-02 PROCEDURE — 25000128 H RX IP 250 OP 636: Performed by: ORTHOPAEDIC SURGERY

## 2017-07-02 PROCEDURE — A9270 NON-COVERED ITEM OR SERVICE: HCPCS | Mod: GY | Performed by: INTERNAL MEDICINE

## 2017-07-02 PROCEDURE — 97535 SELF CARE MNGMENT TRAINING: CPT | Mod: GO | Performed by: OCCUPATIONAL THERAPIST

## 2017-07-02 PROCEDURE — 99233 SBSQ HOSP IP/OBS HIGH 50: CPT | Performed by: INTERNAL MEDICINE

## 2017-07-02 PROCEDURE — 82947 ASSAY GLUCOSE BLOOD QUANT: CPT | Performed by: INTERNAL MEDICINE

## 2017-07-02 PROCEDURE — 25000132 ZZH RX MED GY IP 250 OP 250 PS 637: Mod: GY | Performed by: INTERNAL MEDICINE

## 2017-07-02 PROCEDURE — 40000133 ZZH STATISTIC OT WARD VISIT: Performed by: OCCUPATIONAL THERAPIST

## 2017-07-02 PROCEDURE — 97530 THERAPEUTIC ACTIVITIES: CPT | Mod: GO | Performed by: OCCUPATIONAL THERAPIST

## 2017-07-02 PROCEDURE — 85027 COMPLETE CBC AUTOMATED: CPT | Performed by: INTERNAL MEDICINE

## 2017-07-02 PROCEDURE — 36415 COLL VENOUS BLD VENIPUNCTURE: CPT | Performed by: INTERNAL MEDICINE

## 2017-07-02 PROCEDURE — 40000193 ZZH STATISTIC PT WARD VISIT: Performed by: PHYSICAL THERAPIST

## 2017-07-02 PROCEDURE — 25000128 H RX IP 250 OP 636

## 2017-07-02 PROCEDURE — 97116 GAIT TRAINING THERAPY: CPT | Mod: GP | Performed by: PHYSICAL THERAPIST

## 2017-07-02 PROCEDURE — 97110 THERAPEUTIC EXERCISES: CPT | Mod: GP | Performed by: PHYSICAL THERAPIST

## 2017-07-02 RX ADMIN — ACETAMINOPHEN 975 MG: 325 TABLET, FILM COATED ORAL at 06:17

## 2017-07-02 RX ADMIN — SENNOSIDES AND DOCUSATE SODIUM 1 TABLET: 8.6; 5 TABLET ORAL at 07:44

## 2017-07-02 RX ADMIN — OXYCODONE HYDROCHLORIDE 10 MG: 5 TABLET ORAL at 13:09

## 2017-07-02 RX ADMIN — OXYCODONE HYDROCHLORIDE 10 MG: 5 TABLET ORAL at 18:56

## 2017-07-02 RX ADMIN — VANCOMYCIN HYDROCHLORIDE 1250 MG: 5 INJECTION, POWDER, LYOPHILIZED, FOR SOLUTION INTRAVENOUS at 10:09

## 2017-07-02 RX ADMIN — ACETAMINOPHEN 975 MG: 325 TABLET, FILM COATED ORAL at 13:09

## 2017-07-02 RX ADMIN — ACETAMINOPHEN 975 MG: 325 TABLET, FILM COATED ORAL at 21:26

## 2017-07-02 RX ADMIN — SENNOSIDES AND DOCUSATE SODIUM 2 TABLET: 8.6; 5 TABLET ORAL at 21:26

## 2017-07-02 RX ADMIN — Medication 1 CAPSULE: at 07:44

## 2017-07-02 RX ADMIN — CEFTRIAXONE 2 G: 2 INJECTION, POWDER, FOR SOLUTION INTRAMUSCULAR; INTRAVENOUS at 02:12

## 2017-07-02 RX ADMIN — Medication 200 MG: at 07:44

## 2017-07-02 RX ADMIN — OXYCODONE HYDROCHLORIDE 10 MG: 5 TABLET ORAL at 22:06

## 2017-07-02 RX ADMIN — ENOXAPARIN SODIUM 40 MG: 40 INJECTION SUBCUTANEOUS at 13:09

## 2017-07-02 RX ADMIN — OXYCODONE HYDROCHLORIDE 5 MG: 5 TABLET ORAL at 07:44

## 2017-07-02 NOTE — PROVIDER NOTIFICATION
Notified Dr. Zuleta, ID of pt new culture results. MD acknowledged that pt was on vanco and he would not change anything and will come to see the pt today.

## 2017-07-02 NOTE — CONSULTS
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  HPI    71 yo recently retired from [email or web address] in [place], [place] (> 50 years as )  ~ 3 y ago R total hip arthroplasty  Has had slowly progressive pain R hip => found to have loosening of the cup  To OR  => clinically infected => resection arthroplasty - antibiotic impregnated spacer  No recent febrile illness  No recent antibiotic use  No antibiotic allergies  O/w feels good  No fevers at home  No rigors    ROS Remainder of 17 pt ROS negative      OTHER HISTORY  Past Medical History:   Diagnosis Date     Allergic rhinitis      Arthritis      Gout      Squamous cell cancer of external ear      Past Surgical History:   Procedure Laterality Date     ARTHROPLASTY HIP ANTERIOR Left 3/24/2016    Procedure: ARTHROPLASTY HIP ANTERIOR;  Surgeon: Selvin Witt MD;  Location: [facility] OR     ARTHROSCOPY KNEE       C TOTAL HIP ARTHROPLASTY  2013    right hip , left hip 2017     VASECTOMY  1979       Social History     Social History     Marital status:      Spouse name: N/A     Number of children: N/A     Years of education: N/A     Social History Main Topics     Smoking status: Never Smoker     Smokeless tobacco: None     Alcohol use No     Drug use: No     Sexual activity: Not Asked     Other Topics Concern     None     Social History Narrative     No Known Allergies    There is no immunization history on file for this patient.  Prescription Medications as of 2017             oxyCODONE (ROXICODONE) 5 MG IR tablet 1 tab PO Q4 hours PRN pain 1-5, 2 tab PO Q4 hours PRN pain 6-10     senna-docusate (SENOKOT-S;PERICOLACE) 8.6-50 MG per tablet Take 1-2 tablets by mouth 2 times daily as needed for constipation    aspirin  MG EC tablet Take 1 tablet (325 mg) by mouth 2 times daily    Nutritional Supplements (NUTRITIONAL SUPPLEMENT PO) Take 1 tablet by mouth 2 times daily (with meals) (breakfast and dinner)    Misc Natural Products (TART CHERRY ADVANCED PO) Take 1 capsule by mouth every morning    VITAMIN D, CHOLECALCIFEROL, PO Take 1 tablet by mouth every morning    MAGNESIUM OXIDE PO Take 250 mg by mouth every morning    Ascorbic Acid (VITAMIN C PO) Take 500 mg by mouth every morning    ALPHA LIPOIC ACID PO Take 1 tablet by mouth every morning OTC- unknown strength    COENZYME Q-10 PO Take 100 mg by mouth every morning     multivitamin, therapeutic with minerals (THERA-VIT-M) TABS Take 1 tablet by mouth every morning     Probiotic Product (PROBIOTIC PO) Take 1 capsule by mouth every morning       Facility Administered Medications as of 7/1/2017             vancomycin 1250 mg in 0.9% NaCl 250 mL PREMIX Inject 250 mLs (1,250 mg) into the vein every 12 hours    magnesium oxide (MAG-OX) half-tab 200 mg Take 1 half-tab (200 mg) by mouth every morning    lactobacillus rhamnosus (GG) (CULTURELL) capsule 1 capsule Take 1 capsule by mouth every morning    lidocaine 1 % 1 mL 1 mL by Other route every hour as needed (mild pain with VAD insertion or accessing implanted port)    lidocaine (LMX4) cream Apply topically every hour as needed for pain (with VAD insertion or accessing implanted port.)    sodium chloride (PF) 0.9% PF flush 3 mL 3 mLs by Intracatheter route every hour as needed for line flush (for peripheral IV flush post IV meds)    sodium chloride (PF) 0.9% PF flush 3 mL 3 mLs by Intracatheter route every 8 hours    enoxaparin (LOVENOX) injection 40 mg Inject 0.4 mLs (40 mg) Subcutaneous every 24 hours    ceFAZolin sodium-dextrose (ANCEF) infusion 2 g Inject 100 mLs (2 g) into the vein every  "8 hours    benzocaine-menthol (CHLORASEPTIC) 6-10 MG lozenge 1-2 lozenge Place 1-2 lozenges inside cheek every hour as needed for sore throat (sore throat without fever)    HYDROmorphone (PF) (DILAUDID) injection 0.3-0.5 mg Inject 0.3-0.5 mg into the vein every 2 hours as needed for severe pain (or if patient unable to take PO)    acetaminophen (TYLENOL) tablet 975 mg Take 3 tablets (975 mg) by mouth every 8 hours    acetaminophen (TYLENOL) tablet 650 mg Starting on 7/3/2017. Take 2 tablets (650 mg) by mouth every 4 hours as needed for other (surgical pain)    oxyCODONE (ROXICODONE) IR tablet 5-10 mg Take 1-2 tablets (5-10 mg) by mouth every 3 hours as needed for moderate to severe pain    ketorolac (TORADOL) injection 15 mg Inject 1 mL (15 mg) into the vein every 6 hours    cyclobenzaprine (FLEXERIL) tablet 5 mg Take 1 tablet (5 mg) by mouth 3 times daily as needed for muscle spasms    naloxone (NARCAN) injection 0.1-0.4 mg Inject 0.25-1 mLs (0.1-0.4 mg) into the vein every 2 minutes as needed for opioid reversal    hydrOXYzine (ATARAX) tablet 10 mg Take 1 tablet (10 mg) by mouth every 6 hours as needed (muscle spasm)    ondansetron (ZOFRAN-ODT) ODT tab 4 mg Take 1 tablet (4 mg) by mouth every 6 hours as needed for nausea or vomiting    Linked Group 1:  \"Or\" Linked Group Details     ondansetron (ZOFRAN) injection 4 mg Inject 2 mLs (4 mg) into the vein every 6 hours as needed for nausea or vomiting    Linked Group 1:  \"Or\" Linked Group Details     prochlorperazine (COMPAZINE) injection 5 mg Inject 1 mL (5 mg) into the vein every 6 hours as needed for nausea or vomiting    Linked Group 2:  \"Or\" Linked Group Details     prochlorperazine (COMPAZINE) tablet 5 mg Take 1 tablet (5 mg) by mouth every 6 hours as needed for nausea or vomiting    Linked Group 2:  \"Or\" Linked Group Details     senna-docusate (SENOKOT-S;PERICOLACE) 8.6-50 MG per tablet 1-2 tablet Take 1-2 tablets by mouth 2 times daily    temazepam (RESTORIL) " "capsule 7.5 mg Take 1 capsule (7.5 mg) by mouth nightly as needed for sleep    0.9% sodium chloride infusion Inject into the vein continuous        EXAM  /53 (BP Location: Right arm)  Pulse 69  Temp 98.1  F (36.7  C) (Oral)  Resp 18  Ht 1.753 m (5' 9\")  Wt 86.2 kg (190 lb)  SpO2 96%  BMI 28.06 kg/m2    general   In bed  no acute distress     lungs   clear     skin   No rash on exposed skin     abd   soft     R hip   dressed     neuro   Normal speech/ conversation     DATA    Path    6.30 Path from hip surgery  Report pending (Dr Witt's op note indicates it showed inflammation)    Cultures    6.30 OR cultures  NEG so far    LABS  Recent Labs   Lab Test  03/25/16   0648   WBC  10.2             ASSESSMENT   SUGGESTIONS    (Z96.641)  Status post right hip replacement  (primary encounter diagnosis)  (T84.51XA)  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)    No clue to systemic infection  No recent antibiotic use, so culture will be of highest sensitivity possible (tho' not 100 %)  If cultures neg, could send material from pathology to [facility] for PCR for 16s ribosomal DNA (for bacteria) and 28s ribosomal DNA (for fungi)  Meanwhile consider vanco + ceftriaxone for broad coverage pending cultures    dw pt   the limitations of cultures, possible tx plan if (+) culture (focused) or if (--) cultures (empiric)  True test of cure is eventually monitoring OFF antibiotics  Factors in deciding timing of surgery for new total hip      Shashi Zuleta MD  Covering for Aliya Uriostegui & Kaycee & Albert  [facility] Infectious Diseases  [phone]   "

## 2017-07-02 NOTE — PLAN OF CARE
"Problem: Goal Outcome Summary  Goal: Goal Outcome Summary  Sticky noted entered for MD to clarify hip precautions. PT order states: \"No hip precautions or restrictions.\" and \"my-lateral hip precautions\" order entered.      Discharge Planner PT   Patient plan for discharge: Return home with assist from spouse.      Current status: Patient performed supine <> sit, SBA. Sit <> stand and ambulation of 215 feet with FWW and SBA. Patient demonstrated ability to navigate 6 stairs with B railings and CGA for safety.      Barriers to return to prior living situation: None    Recommendations for discharge: Return home with spouse.     Rationale for recommendations: Patient is progressing and increasing independence with functional mobility and patient's spouse will be able to assist at time of discharge.        Entered by: Shwetha Keating 07/02/2017 10:43 AM             "

## 2017-07-02 NOTE — PROGRESS NOTES
[facility]    Hospitalist Progress Note    Date of Service (when I saw the patient): 07/02/2017    Assessment & Plan   Nhan Marc is a 72 year old male with h/o gout, arthritis and SCC of the ear who was admitted on 6/30/2017 by Ortho for an infected right hip arthroplasty.       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   Patient underwent surgery 6/30/17 with Dr. Witt. Patient was under general anesthesia with an EBL of 2400 cc.    - POD # 2  - Postoperative cares including IV fluids, DVT prophylaxis, transfusions and pain control per orthopedic surgery.   - Infectious Disease has been consulted for further antibiotic guidance.  Continues on Vanco and Rocephin.    - Hip cultures pending.  Thus far all negative including gram stains.   - Tissue path per Ortho op note shows acute inflammation.   - Could consider sending fluid for crystals.   - Afebrile.      H/o Gout with Right foot pain.   Patient has had four days of edematous right foot. Reportedly red in am of admit but has since returned to normal. Patient feels this may be gout despite limited pain.   - No obvious tenderness to palpation.  + Pedal edema. Some blanchable erythema on the lateral aspect of the foot near the little toe. No joint pain.   - If this was an acute gout flare it is resolving and not needing acute treatment.   - Low suspicion for infection. Keep elevated as able and apply ice as needed.     Acute blood loss anemia.   Patient had EBL of 2400 cc intraoperatively. Postoperative hemoglobin 9.0. Pre-op was 12.6.    - Hgb 7.4-->6.9 today.  Asymptomatic. Repeat pending but suspect he will need a transfusion.   - Transfuse for less than 7.  Conditional orders placed.     Urinary retention  Straight cath'd for 1000 ml overnight 7/1.  Was able to urinate 300 ml on his own today after PT.   - Will continue to follow and bladder scan as  needed.       DVT Prophylaxis: Defer to primary service  Code Status: Full Code     Disposition:  Per Ortho. Awaiting cultures. Follow Hgb.       Joseluis Hatch       Interval History   Xu any present complaints.  Hip pain controlled. Denies R foot pain. Small BM yesterday, + Flatus.       -Data reviewed today: I reviewed all new labs and imaging results over the last 24 hours. I personally reviewed no images or EKG's today.    Physical Exam   Temp: 98.1  F (36.7  C) Temp src: Oral BP: 124/55   Heart Rate: 85 Resp: 16 SpO2: 94 % O2 Device: None (Room air)    Vitals:    06/30/17 1203   Weight: 86.2 kg (190 lb)     Vital Signs with Ranges  Temp:  [97.9  F (36.6  C)-98.7  F (37.1  C)] 98.1  F (36.7  C)  Heart Rate:  [76-86] 85  Resp:  [16-20] 16  BP: (103-124)/(48-66) 124/55  SpO2:  [92 %-99 %] 94 %  I/O last 3 completed shifts:  In: 2574 [P.O.:2000; I.V.:574]  Out: 4667 [Urine:4527; Drains:140]    Gen: Patient in no acute distress.  Appears comfortable.  Heart:  S1S2+, regular rate and rhythm, No murmurs.  Lungs:  Clear to auscultation, no wheezing, no rales.   Abdomen:  Soft, non tender, mildly  distended, bowel sounds positive.  Extremities:  Pedal edema. Some blanchable erythema on the lateral aspect of the foot near the little toe. No joint pain.    Medications        vancomycin (VANCOCIN) IV  1,250 mg Intravenous Q12H     magnesium oxide  200 mg Oral QAM     lactobacillus rhamnosus (GG)  1 capsule Oral QAM     cefTRIAXone  2 g Intravenous Q24H     sodium chloride (PF)  3 mL Intracatheter Q8H     enoxaparin  40 mg Subcutaneous Q24H     acetaminophen  975 mg Oral Q8H     senna-docusate  1-2 tablet Oral BID       Data     Recent Labs  Lab 07/02/17  0623 07/01/17  1513 07/01/17  0647  06/30/17  1621 06/30/17  1218   WBC 9.3  --   --   --   --   --    HGB 6.9* 7.2* 7.4*  < > 10.5* 12.6*   MCV 86  --   --   --   --   --      --   --   --   --  329   NA  --   --  137  --  139  --    POTASSIUM  --   --   4.2  --  4.1  --    CHLORIDE  --   --  105  --   --   --    CO2  --   --  24  --   --   --    BUN  --   --  17  --   --   --    CR  --   --  0.85  --   --  0.68   ANIONGAP  --   --  8  --   --   --    IVANNA  --   --  7.6*  --   --   --    *  --  128*  --   --   --    < > = values in this interval not displayed.    No results found for this or any previous visit (from the past 24 hour(s)).

## 2017-07-02 NOTE — PROGRESS NOTES
Ortho chart review.     I am out of town,Dr. foreman to round.       Appreciate I/D.   Will await cultures.   SS to see regarding IV ABX.   Pt would like to avoid nursing home if possible.     May need transfusion     jang

## 2017-07-02 NOTE — PROGRESS NOTES
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       HPI  73 yo recently retired from [employer] in [place], [place] (> 50 years as )  ~ 3 y ago R total hip arthroplasty  Has had slowly progressive pain R hip => found to have loosening of the cup  To OR 6/30 => clinically infected => resection arthroplasty - antibiotic impregnated spacer  No recent febrile illness  No recent antibiotic use  No antibiotic allergies  O/w feels good  No fevers at home  No rigors    Cultures now all (+) for b-hemolytic Strep (no additional ID yet)    ROS  Feels fine    Remainder of 14-point ROS was negative except as listed above    PFSH:  Personal / Family / Social Histories were reviewed and updated  nothing new      CURRENT MED REVIEWD    Prescription Medications as of 7/2/2017             oxyCODONE (ROXICODONE) 5 MG IR tablet 1 tab PO Q4 hours PRN pain 1-5, 2 tab PO Q4 hours PRN pain 6-10    senna-docusate (SENOKOT-S;PERICOLACE) 8.6-50 MG per tablet Take 1-2 tablets by mouth 2 times daily as needed for constipation    aspirin  MG EC tablet Take 1 tablet (325 mg) by mouth 2 times daily    Nutritional Supplements (NUTRITIONAL SUPPLEMENT PO) Take 1 tablet by mouth 2 times daily (with meals) (breakfast and dinner)    Misc Natural Products (TART CHERRY ADVANCED PO) Take 1 capsule by mouth every morning    VITAMIN D, CHOLECALCIFEROL, PO Take 1 tablet by mouth every morning    MAGNESIUM OXIDE PO Take 250 mg by mouth every morning    Ascorbic Acid (VITAMIN C PO) Take 500 mg by mouth every morning    ALPHA LIPOIC ACID PO Take 1 tablet by mouth every morning OTC- unknown strength    COENZYME Q-10 PO Take 100 mg by mouth every morning     multivitamin, therapeutic with minerals (THERA-VIT-M) TABS  Take 1 tablet by mouth every morning     Probiotic Product (PROBIOTIC PO) Take 1 capsule by mouth every morning       Facility Administered Medications as of 7/2/2017             vancomycin 1250 mg in 0.9% NaCl 250 mL PREMIX Inject 250 mLs (1,250 mg) into the vein every 12 hours    magnesium oxide (MAG-OX) half-tab 200 mg Take 1 half-tab (200 mg) by mouth every morning    lactobacillus rhamnosus (GG) (CULTURELL) capsule 1 capsule Take 1 capsule by mouth every morning    cefTRIAXone (ROCEPHIN) 2 g vial to attach to  ml bag for ADULTS or NS 50 ml bag for PEDS Inject 2 g into the vein every 24 hours    lidocaine 1 % 1 mL 1 mL by Other route every hour as needed (mild pain with VAD insertion or accessing implanted port)    lidocaine (LMX4) cream Apply topically every hour as needed for pain (with VAD insertion or accessing implanted port.)    sodium chloride (PF) 0.9% PF flush 3 mL 3 mLs by Intracatheter route every hour as needed for line flush (for peripheral IV flush post IV meds)    sodium chloride (PF) 0.9% PF flush 3 mL 3 mLs by Intracatheter route every 8 hours    enoxaparin (LOVENOX) injection 40 mg Inject 0.4 mLs (40 mg) Subcutaneous every 24 hours    benzocaine-menthol (CHLORASEPTIC) 6-10 MG lozenge 1-2 lozenge Place 1-2 lozenges inside cheek every hour as needed for sore throat (sore throat without fever)    HYDROmorphone (PF) (DILAUDID) injection 0.3-0.5 mg Inject 0.3-0.5 mg into the vein every 2 hours as needed for severe pain (or if patient unable to take PO)    acetaminophen (TYLENOL) tablet 975 mg Take 3 tablets (975 mg) by mouth every 8 hours    acetaminophen (TYLENOL) tablet 650 mg Starting on 7/3/2017. Take 2 tablets (650 mg) by mouth every 4 hours as needed for other (surgical pain)    oxyCODONE (ROXICODONE) IR tablet 5-10 mg Take 1-2 tablets (5-10 mg) by mouth every 3 hours as needed for moderate to severe pain    cyclobenzaprine (FLEXERIL) tablet 5 mg Take 1 tablet (5 mg) by mouth 3 times  "daily as needed for muscle spasms    naloxone (NARCAN) injection 0.1-0.4 mg Inject 0.25-1 mLs (0.1-0.4 mg) into the vein every 2 minutes as needed for opioid reversal    hydrOXYzine (ATARAX) tablet 10 mg Take 1 tablet (10 mg) by mouth every 6 hours as needed (muscle spasm)    ondansetron (ZOFRAN-ODT) ODT tab 4 mg Take 1 tablet (4 mg) by mouth every 6 hours as needed for nausea or vomiting    Linked Group 1:  \"Or\" Linked Group Details     ondansetron (ZOFRAN) injection 4 mg Inject 2 mLs (4 mg) into the vein every 6 hours as needed for nausea or vomiting    Linked Group 1:  \"Or\" Linked Group Details     prochlorperazine (COMPAZINE) injection 5 mg Inject 1 mL (5 mg) into the vein every 6 hours as needed for nausea or vomiting    Linked Group 2:  \"Or\" Linked Group Details     prochlorperazine (COMPAZINE) tablet 5 mg Take 1 tablet (5 mg) by mouth every 6 hours as needed for nausea or vomiting    Linked Group 2:  \"Or\" Linked Group Details     senna-docusate (SENOKOT-S;PERICOLACE) 8.6-50 MG per tablet 1-2 tablet Take 1-2 tablets by mouth 2 times daily    temazepam (RESTORIL) capsule 7.5 mg Take 1 capsule (7.5 mg) by mouth nightly as needed for sleep    0.9% sodium chloride infusion (Discontinued) Inject into the vein continuous          Vital Signs: /54 (BP Location: Left arm)  Pulse 69  Temp 97.5  F (36.4  C) (Axillary)  Resp 16  Ht 1.753 m (5' 9\")  Wt 86.2 kg (190 lb)  SpO2 94%  BMI 28.06 kg/m2  EXAM    general   In bed  no acute distress     neuro   Many questions  Normal conversation     back   normal     skin   normal     joints   R hip dressed  Other joints as expected for 72 y old  /      lungs   clear       Data  Cultures    All operative cultures with b-hemolytic Strep     LABS  Lab Results   Component Value Date/Time    WBC 9.3 07/02/2017 06:23 AM    WBC 10.2 03/25/2016 06:48 AM         ASSESSMENT & SUGGESTIONS    (Z96.641) Status post right hip replacement  (primary encounter " diagnosis)  (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)  (A49.1) 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    b-hemolytic Strep (Group A, B or C/G) isolated from all hip cultures  Uniformly S in vitro to ceftriaxone  Stop vanco / continue ceftriaxone  Plan 2 g iv q24h x 6 weeks  PICC    Aliya Uriostegui / Kaycee or Albert to craft final antibiotic arrangements tomorrow  I reviewed w pt   - PICC (what it is, rationale for it, potential complications)  - once daily ceftriaxone for 6 week plan  - only / true test of cure is observing over time once finally off antibiotics  - no guarantee of cure despite surgery and 6 weeks iv antibiotics       Shashi Zuleta MD  Covering for [facility] Infectious Diseases  [phone]

## 2017-07-02 NOTE — PROGRESS NOTES
"[facility]  Orthopaedics/Foot and Ankle Surgery  Daily Post-Op Note    07/02/2017          Assessment and Plan:    Assessment:   Post-operative day #2  Procedure(s) with comments:  COMBINED REMOVE HARDWARE ARTHROPLASTY HIP, IRRIGATION AND DEBRIDEMENT, PLACE ANTIBIOTIC CEMENT BEADS / SPACER (Right) - REMOVAL OF TOTAL HIP ARTHROPLASTY AND PLACEMENT OF ANTIBIOTIC SPACER        Plan:   1. Weight bearing and activity per Dr. Ruiz.  May keep limb elevated while at rest to limit swelling and pain.  2. Cont. current pain regimen.  Under appropriate control at this time.  3. PT/OT.  4. Lovenox, SCDs for DVT prophy.  5. Appreciate IID co-management.  Cultures NGTD at this time.  Cont. vanco and ceftriaxone.  6. Appreciate hospitalist co-management.  No acute issues.  Hgb slight drop to 6.9 but completely asymptomatic, vitals stable, and participated with PT this a.m. without dizziness or SOB.  Will continue to monitor.  Hgb relatively stable compared to yesterday and would not anticipate further drop.  If any symptoms develop, will plan transfusion.  7. Dispo pending culture results, ID recommendations.            Interval History:   No acute events overnight.  Denies f/c.  R hip pain well controlled.  Denies SOB, CP.              Physical Exam:   Blood pressure 124/55, pulse 69, temperature 98.1  F (36.7  C), temperature source Oral, resp. rate 16, height 1.753 m (5' 9\"), weight 86.2 kg (190 lb), SpO2 94 %.  I/O last 3 completed shifts:  In: 2574 [P.O.:2000; I.V.:574]  Out: 4667 [Urine:4527; Drains:140]    R hip dressings c/d/i.  AT, GSC, EHL intact.  SILT sp/dp/plantar nn.  Toes wwp w/ brisk cap refill.  No focal calf swelling or tenderness.           Data:   All laboratory data related to this surgery reviewed.  Cultures still NGTD.  Hgb 6.9 this a.m.    Recent Labs   Lab Test  07/02/17   0623  07/01/17   1513  07/01/17   0647  06/30/17   1811  06/30/17   1621   HGB  6.9*  7.2*  7.4*  9.0*  10.5*     No " lab results found.   Recent Labs   Lab Test  07/02/17   0623  07/01/17   0647   WBC  9.3   --    PLT  221   --    POTASSIUM   --   4.2   CR   --   0.85

## 2017-07-02 NOTE — PLAN OF CARE
Problem: Goal Outcome Summary  Goal: Goal Outcome Summary  Outcome: Improving  Up with one and a walker, needs reminders to call for help when getting up. Pt states pain well controlled with oxycodone. Voiding small, frequent amounts, post void scan was for 265. Encouraged pt to go the bathroom to void. ID notified of culture results, no change in antibiotics at this time. Hemoglobin recheck was 7 this afternoon. Pt is hoping to discharge home on Monday.

## 2017-07-02 NOTE — PROVIDER NOTIFICATION
Paged call to oncall ortho regarding pt low Hgb 6.9 this morning.Awaiting call back.     MD Zhang call back and he will be rounding on the pt this morning and will recheck his Hbg again this morning.

## 2017-07-02 NOTE — PLAN OF CARE
Discharge Planner OT   Patient plan for discharge: home with spouse to assist  Current status: SBA LE dressing with AE, pt demonstrated increased IND with use of reacher and sock aide. SBA and VC for hand placement for transfers. Pt hemoglobin low, has no symptoms of dizziness or lightheadedness with standing/activity. Pt participated in ed regarding fall prevention and home safety, pt able to demonstrate understanding of hip precautions. Pt has decided to purchase AE from outside vendor. Family participated in ed regarding equipment recommendations, pt and family verbalized understanding.   Barriers to return to prior living situation: none  Recommendations for discharge: home with spouse to assist  Rationale for recommendations: home with spouse to assist       Entered by: Dacia Vanessa 07/02/2017 10:20 AM

## 2017-07-02 NOTE — PLAN OF CARE
Problem: Goal Outcome Summary  Goal: Goal Outcome Summary  Outcome: Improving  Patient up with SBA of 1 and walker.  Patient denies pain; given scheduled Tylenol and PRN Flexiril.  Ice to hip and R edematous ankle.  VSS.  A/Ox4.  Dressing changed; scant serosanguinous drainage inside of dressing.  Dressing CDI.  Hemovac removed.  CMS intact.  IV antibiotics given; infectious disease following.

## 2017-07-02 NOTE — PROGRESS NOTES
Lab called with results from a specimen collected on the 30th that was positive for light growth of beta hemalytic strep. Assigned RN notified of results.

## 2017-07-02 NOTE — PLAN OF CARE
Problem: Goal Outcome Summary  Goal: Goal Outcome Summary  Outcome: Improving  A/OX4,cms intact.Denies pain.Up with 1 assist /walker.Pt voiding per urinal,but has post residual of more then 600.Straight cath X1 for 1000ml.Dreg CDI,IV SL.Will continue to monitor.

## 2017-07-03 ENCOUNTER — APPOINTMENT (OUTPATIENT)
Dept: PHYSICAL THERAPY | Facility: CLINIC | Age: 72
DRG: 464 | End: 2017-07-03
Attending: ORTHOPAEDIC SURGERY
Payer: MEDICARE

## 2017-07-03 ENCOUNTER — APPOINTMENT (OUTPATIENT)
Dept: OCCUPATIONAL THERAPY | Facility: CLINIC | Age: 72
DRG: 464 | End: 2017-07-03
Attending: ORTHOPAEDIC SURGERY
Payer: MEDICARE

## 2017-07-03 VITALS
BODY MASS INDEX: 28.14 KG/M2 | WEIGHT: 190 LBS | OXYGEN SATURATION: 96 % | DIASTOLIC BLOOD PRESSURE: 83 MMHG | TEMPERATURE: 98.7 F | SYSTOLIC BLOOD PRESSURE: 149 MMHG | RESPIRATION RATE: 16 BRPM | HEIGHT: 69 IN | HEART RATE: 87 BPM

## 2017-07-03 LAB
BACTERIA SPEC CULT: ABNORMAL
BLD PROD TYP BPU: NORMAL
BLD PROD TYP BPU: NORMAL
BLD UNIT ID BPU: 0
BLD UNIT ID BPU: 0
BLOOD PRODUCT CODE: NORMAL
BLOOD PRODUCT CODE: NORMAL
BPU ID: NORMAL
BPU ID: NORMAL
COPATH REPORT: NORMAL
CREAT SERPL-MCNC: 0.69 MG/DL (ref 0.66–1.25)
GFR SERPL CREATININE-BSD FRML MDRD: NORMAL ML/MIN/1.7M2
HGB BLD-MCNC: 6.4 G/DL (ref 13.3–17.7)
Lab: ABNORMAL
MICRO REPORT STATUS: ABNORMAL
PLATELET # BLD AUTO: 231 10E9/L (ref 150–450)
SPECIMEN SOURCE: ABNORMAL
TRANSFUSION STATUS PATIENT QL: NORMAL

## 2017-07-03 PROCEDURE — 25000128 H RX IP 250 OP 636: Performed by: INTERNAL MEDICINE

## 2017-07-03 PROCEDURE — 40000193 ZZH STATISTIC PT WARD VISIT: Performed by: PHYSICAL THERAPIST

## 2017-07-03 PROCEDURE — 82565 ASSAY OF CREATININE: CPT | Performed by: ORTHOPAEDIC SURGERY

## 2017-07-03 PROCEDURE — 97535 SELF CARE MNGMENT TRAINING: CPT | Mod: GO | Performed by: OCCUPATIONAL THERAPIST

## 2017-07-03 PROCEDURE — 27210218 ZZH KIT SHRLOCK 4FR SNGL LUM PICC

## 2017-07-03 PROCEDURE — 25000132 ZZH RX MED GY IP 250 OP 250 PS 637: Mod: GY | Performed by: INTERNAL MEDICINE

## 2017-07-03 PROCEDURE — 25000128 H RX IP 250 OP 636: Performed by: ORTHOPAEDIC SURGERY

## 2017-07-03 PROCEDURE — 99232 SBSQ HOSP IP/OBS MODERATE 35: CPT | Performed by: INTERNAL MEDICINE

## 2017-07-03 PROCEDURE — A9270 NON-COVERED ITEM OR SERVICE: HCPCS | Mod: GY | Performed by: ORTHOPAEDIC SURGERY

## 2017-07-03 PROCEDURE — 97110 THERAPEUTIC EXERCISES: CPT | Mod: GP | Performed by: PHYSICAL THERAPIST

## 2017-07-03 PROCEDURE — 85018 HEMOGLOBIN: CPT | Performed by: ORTHOPAEDIC SURGERY

## 2017-07-03 PROCEDURE — 40000141 ZZH STATISTIC PERIPHERAL IV START W/O US GUIDANCE

## 2017-07-03 PROCEDURE — 25000132 ZZH RX MED GY IP 250 OP 250 PS 637: Mod: GY | Performed by: ORTHOPAEDIC SURGERY

## 2017-07-03 PROCEDURE — 97116 GAIT TRAINING THERAPY: CPT | Mod: GP | Performed by: PHYSICAL THERAPIST

## 2017-07-03 PROCEDURE — 25000125 ZZHC RX 250: Performed by: INTERNAL MEDICINE

## 2017-07-03 PROCEDURE — 36569 INSJ PICC 5 YR+ W/O IMAGING: CPT

## 2017-07-03 PROCEDURE — 85049 AUTOMATED PLATELET COUNT: CPT | Performed by: ORTHOPAEDIC SURGERY

## 2017-07-03 PROCEDURE — A9270 NON-COVERED ITEM OR SERVICE: HCPCS | Mod: GY | Performed by: INTERNAL MEDICINE

## 2017-07-03 PROCEDURE — 40000133 ZZH STATISTIC OT WARD VISIT: Performed by: OCCUPATIONAL THERAPIST

## 2017-07-03 PROCEDURE — P9016 RBC LEUKOCYTES REDUCED: HCPCS | Performed by: PHYSICIAN ASSISTANT

## 2017-07-03 PROCEDURE — 36415 COLL VENOUS BLD VENIPUNCTURE: CPT | Performed by: ORTHOPAEDIC SURGERY

## 2017-07-03 RX ORDER — CEFTRIAXONE 1 G/1
2000 INJECTION, POWDER, FOR SOLUTION INTRAMUSCULAR; INTRAVENOUS DAILY
Qty: 600 ML | Refills: 0 | DISCHARGE
Start: 2017-07-03 | End: 2017-08-12

## 2017-07-03 RX ADMIN — LIDOCAINE HYDROCHLORIDE 1 ML: 10 INJECTION, SOLUTION INFILTRATION; PERINEURAL at 16:04

## 2017-07-03 RX ADMIN — ENOXAPARIN SODIUM 40 MG: 40 INJECTION SUBCUTANEOUS at 14:58

## 2017-07-03 RX ADMIN — ACETAMINOPHEN 975 MG: 325 TABLET, FILM COATED ORAL at 14:53

## 2017-07-03 RX ADMIN — Medication 200 MG: at 08:20

## 2017-07-03 RX ADMIN — ACETAMINOPHEN 650 MG: 325 TABLET, FILM COATED ORAL at 18:51

## 2017-07-03 RX ADMIN — CEFTRIAXONE 2 G: 2 INJECTION, POWDER, FOR SOLUTION INTRAMUSCULAR; INTRAVENOUS at 00:39

## 2017-07-03 RX ADMIN — ACETAMINOPHEN 975 MG: 325 TABLET, FILM COATED ORAL at 06:16

## 2017-07-03 RX ADMIN — OXYCODONE HYDROCHLORIDE 5 MG: 5 TABLET ORAL at 12:30

## 2017-07-03 RX ADMIN — Medication 1 CAPSULE: at 08:20

## 2017-07-03 RX ADMIN — OXYCODONE HYDROCHLORIDE 5 MG: 5 TABLET ORAL at 08:21

## 2017-07-03 RX ADMIN — OXYCODONE HYDROCHLORIDE 10 MG: 5 TABLET ORAL at 17:52

## 2017-07-03 RX ADMIN — SENNOSIDES AND DOCUSATE SODIUM 2 TABLET: 8.6; 5 TABLET ORAL at 08:21

## 2017-07-03 NOTE — PLAN OF CARE
Problem: Goal Outcome Summary  Goal: Goal Outcome Summary  Outcome: Improving  Alert and oriented x4  . hgb low , infusing 1 units of blood , tolerating well . VSS. Fair appetite . CMS intact , dressing changed .Vital signs stable . Discharge to home after 2nd units of blood .

## 2017-07-03 NOTE — DISCHARGE INSTRUCTIONS
Attend outpatient IV therapy as ordered by Dr. Parker/Carolynn Urbina at the [facility] outpatient IV clinic.

## 2017-07-03 NOTE — PROGRESS NOTES
[facility]    Infectious Disease Progress Note    Date of Service (when I saw the patient): 07/03/2017     Assessment & Plan   Nhan Marc is a 72 year old male who was admitted on 6/30/2017.     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   Plan 2 g iv q24h x 6 weeks  PICC     - once daily ceftriaxone for 6 week plan  - only / true test of cure is observing over time once finally off antibiotics  - no guarantee of cure despite surgery and 6 weeks iv antibiotics    Yudith Price MD    Interval History   Afebrile   SW looking in to outpatient coverage.     Physical Exam   Temp: 97.9  F (36.6  C) Temp src: Oral BP: 128/56   Heart Rate: 83 Resp: 16 SpO2: 95 % O2 Device: None (Room air)    Vitals:    06/30/17 1203   Weight: 86.2 kg (190 lb)     Vital Signs with Ranges  Temp:  [97.5  F (36.4  C)-98.5  F (36.9  C)] 97.9  F (36.6  C)  Heart Rate:  [75-87] 83  Resp:  [16] 16  BP: (110-128)/(51-56) 128/56  SpO2:  [94 %-100 %] 95 %    Constitutional: Awake, alert, cooperative, no apparent distress  Lungs: Clear to auscultation bilaterally, no crackles or wheezing  Cardiovascular: Regular rate and rhythm, normal S1 and S2, and no murmur noted  Abdomen: Normal bowel sounds, soft, non-distended, non-tender  Skin: No rashes, no cyanosis, no edema  Other:    Medications        magnesium oxide  200 mg Oral QAM     lactobacillus rhamnosus (GG)  1 capsule Oral QAM     cefTRIAXone  2 g Intravenous Q24H     sodium chloride (PF)  3 mL Intracatheter Q8H     enoxaparin  40 mg  Subcutaneous Q24H     acetaminophen  975 mg Oral Q8H     senna-docusate  1-2 tablet Oral BID       Data   All microbiology laboratory data reviewed.  Recent Labs   Lab Test  07/03/17   0656  07/02/17   1335  07/02/17   0623   06/30/17   1218  03/25/16   0648   WBC   --    --   9.3   --    --   10.2   HGB  6.4*  7.0*  6.9*   < >  12.6*  10.8*   HCT   --    --   20.8*   --    --   32.9*   MCV   --    --   86   --    --   91   PLT  231   --   221   --   329  197    < > = values in this interval not displayed.     Recent Labs   Lab Test  07/03/17   0656  07/01/17   0647  06/30/17   1218   CR  0.69  0.85  0.68     No lab results found.  Recent Labs   Lab Test  06/30/17   1538  06/30/17   1534  06/30/17   1457  06/30/17   1444  06/30/17   1439  06/01/17   0830   CULT  Single colony Beta hemolytic Streptococcus  Culture in progress  *  Culture negative monitoring continues  Light growth Beta hemolytic Streptococcus  Critical Value/Significant Value, preliminary result only, called to and read   back by  Lizet Ha RN (7/2/17 @ 5306)JR  *  Culture negative monitoring continues  Culture negative monitoring continues  Culture negative monitoring continues  Canceled, Test credited Test reordered as correct code REORDERED AS A TISSUE   CULTURE    Culture negative monitoring continues  Culture negative monitoring continues  Culture negative monitoring continues  Culture negative monitoring continues  No anaerobes isolated  No growth

## 2017-07-03 NOTE — PLAN OF CARE
Discharge Planner OT   Patient plan for discharge: home with spouse to assist  Current status: Pt up in chair, willing to participate.  Brought pt up to seventh floor sattelite to try tub/car transfer using hip precautions.  Pt able to demonstrate transfer with SBA using shower chair.  Applied tranfer to pt's car and answered questions he and spouse had.  Came back to room where pt completed a toilet transfer and toileting with SBA.  Transfered back to bed to start his blood transfusion.  Barriers to return to prior living situation: none  Recommendations for discharge: home with spouse to assist  Rationale for recommendations: Spouse is supportive, pt has experience with this type of surgery and hip precautions.  Progressing according to plan.       Entered by: Trevor Erickson 07/03/2017 1:29 PM        Occupational Therapy Discharge Summary    Reason for therapy discharge:    All goals and outcomes met, no further needs identified.    Progress towards therapy goal(s). See goals on Care Plan in [facility] electronic health record for goal details.  Goals met    Therapy recommendation(s):    No further therapy is recommended.

## 2017-07-03 NOTE — PLAN OF CARE
Problem: Goal Outcome Summary  Goal: Goal Outcome Summary  Outcome: No Change  A&Ox4. VSS CMS intact. Dressing CDI. Pain managed, PRN oxycodone available, scheduled tylenol. Plan for PICC/midline placement today and discharge.

## 2017-07-03 NOTE — CONSULTS
Care Transition Initial Assessment - RN        Met with: Patient and spouse.  Checked home IV antibiotic coverage, and found to not have coverage.  Patient is determined to go home at discharge, and stated they live in [place], and that their doctor Dr. Parker can assist in getting them in for outpatient antibiotics through the [facility] system.  Called [facility] at [phone] and spoke to the center about the patient's case.  When Dr. Parker's name mentioned, was given his personal number, and was able to connect Dr. Parker to the ortho PA for a doc to doc report.  Await ID to see and complete discharge orders, then fax to [phone].  They will then call undersigned to transfer the phone to patient to coordinate the chair times at the infusion clinic.  Addendum: [facility] called and spoke to spouse, they have an appointment tomorrow at the clinic for 8:30am.  Orders written by Dr. Parker for abx, doc-doc report given.  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        Contact information and PCP information verified: Yes    Lives With: spouse  Living Arrangements: house                   Insurance concerns: Other, choosing to go outpatient for IV antibiotics.  Refusing to go to TCU at discharge at any cost, so choosing outpatient IV by default.    ASSESSMENT  Patient currently receives the following services:  none        Identified issues/concerns regarding health management: none    PLAN  Financial costs for the patient include TBD, they understand that they will need to contact their insurance company to determine any copays, etc .  Patient given options and choices for discharge yes, priv pay home abx, TCU, outpatient IV's .  Patient/family is agreeable to the plan?  Yes  Patient  anticipates discharging to home .        Patient anticipates needs for home equipment: No  Discharge Planner   Discharge Plans in progress: home with outpatient IV antibiotics  Barriers to discharge plan: none  Plan/Disposition: Home   Appointments: TBD        Care  (CTS) will continue to follow as needed.

## 2017-07-03 NOTE — PLAN OF CARE
Problem: Goal Outcome Summary  Goal: Goal Outcome Summary  Outcome: Improving  Patient up with assist of 1 and walker.  Pain managed with Oxycodone and scheduled Tylenol.  VSS.  A/Ox4.  Moderate moist drainage present; dressing changed; new dressing CDI.  CMS intact.  Plan to D/C tomorrow with IV Rocephin; PICC line or midline to be placed tomorrow.

## 2017-07-03 NOTE — PROGRESS NOTES
Ortho chart review    I am out of town, but have reviewed chart.    Appreciate I/D input, now once daily dosing of ABX based upon sensitivities    Will d/c once  determines benefits, etc,    Will need 2 u prbc given HB this morning.    jang

## 2017-07-03 NOTE — PLAN OF CARE
"Problem: Goal Outcome Summary  Goal: Goal Outcome Summary  Sticky noted entered for MD to clarify hip precautions. PT order states: \"No hip precautions or restrictions.\" and \"my-lateral hip precautions\" order entered.      Discharge Planner PT   Patient plan for discharge: Return home with assist from spouse.      Current status: Noted hemoglobin of 6.4, per discussion with RN patient is appropriate to participate in therapy session due to being asymptomatic. Patient performed bed mobility and transfers with FWW, SBA. Patient ambulated 100 feet and 150 feet with FWW and SBA. Patient tolerated supine strengthening/ROM exercises.     Barriers to return to prior living situation: None    Recommendations for discharge: Return home with spouse.     Rationale for recommendations: Patient is progressing and increasing independence with functional mobility and patient's spouse will be able to assist at time of discharge.        Entered by: Shwetha Keating 07/03/2017 8:59 AM             "

## 2017-07-03 NOTE — PROGRESS NOTES
"Nhan Marc is a 72 year old male patient.  1. Status post right hip replacement    2. 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) 7/1/2017     Squamous cell cancer of external ear      Current Outpatient Prescriptions   Medication Sig Dispense Refill     oxyCODONE (ROXICODONE) 5 MG IR tablet 1 tab PO Q4 hours PRN pain 1-5, 2 tab PO Q4 hours PRN pain 6-10 50 tablet 0     senna-docusate (SENOKOT-S;PERICOLACE) 8.6-50 MG per tablet Take 1-2 tablets by mouth 2 times daily as needed for constipation 60 tablet 0     aspirin  MG EC tablet Take 1 tablet (325 mg) by mouth 2 times daily 60 tablet 0     No Known Allergies  Principal Problem:    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 rate 16, height 1.753 m (5' 9\"), weight 86.2 kg (190 lb), SpO2 95 %.    Subjective   POD #3 Revision right total hip with placement of antibiotic spacer  Patient doing well. Anticipating D/C today. Advised patient that his insurance will not cover home IV therapy, patient would prefer to go to out patient clinic daily for treatment over SNF  Adequqate pain control with oxycodone    Objective   Gen: A &O x 3, resting comfortably in bed  Neuro: sensation intact in LEs, no focal deficits  Vascular: normal pulses in LE, calfs non tender b/l  MSK: able to SLR right leg, moves feet without difficulty  Skin: Dressing on right hip intact, skin sanginous drainage centrally.  JAMSHID stockings bilateraly    Assessment & Plan   Dressing change " to aquacel prior to discharge  Care Coordinator, [place], working on arrangements for out patient IV therapy. May D/C when this is arranged  Aspirin and teds for DVT prophylaxis at home    Spoke to Dr. Parker, patient's PCP. Gave patient's report and plan per Dr. Talavera and Infectious disease provider recommendations. Dr. Parker agreed to treat [facility] as out patient with IV Ancef 2 gm q24 hours x 6 weeks.    Carolynn Urbina  7/3/2017

## 2017-07-03 NOTE — PLAN OF CARE
Problem: Goal Outcome Summary  Goal: Goal Outcome Summary  PT: Patient receiving 2 units of blood and then PICC line to be placed. Plan for patient to discharge home with assist from spouse once PICC line is placed this PM. Patient reports no concerns about returning home in regards to functional mobility.      Physical Therapy Discharge Summary     Reason for therapy discharge:    Discharged to home with assist from spouse     Progress towards therapy goal(s). See goals on Care Plan in [facility] electronic health record for goal details.  Goals not met.  Barriers to achieving goals:   discharge from facility.     Therapy recommendation(s):    Continue home exercise program.

## 2017-07-03 NOTE — PROGRESS NOTES
ELISEO  D: SW noted the order for SW to assist with discharge planning.  Noted that the plan is for patient to return home with Outpatient IV Therapy.  Care Coordinator to arrange the Outpatient IV Therapy appointments.  ELISEO completed the SW orders as there are currently no SW needs identified at this time.  P: Please re consult SW if any unmet needs arise.    Janette Smith, EVONNE

## 2017-07-03 NOTE — PROVIDER NOTIFICATION
Call and left a voice message to GENOVEVA Okeefe regarding pt low Hgb of 6.4.Will awiating call back.

## 2017-07-03 NOTE — PROGRESS NOTES
[facility]    Hospitalist Progress Note  Monico Ventura MD    Assessment & Plan   72 year old male, with PmHx gout, arthritis and squamous cell cancer of the ear, who underwent removal of right hip hardware, with placement of antibiotic space on 6/30/17 admitted on 6/30/2017 by Ortho for an infected right hip arthroplasty.        1. POD# 3 removal of failed hardware, with placement of antibiotic spacer: for management per Orthopedics. ID was consulted to see the pt. Surgical hip cultures on 6/30/17 revealed, beta hemolytic strep. Continue IV Rocephin qd for 6 weeks. For PICC line insertion on 7/3/17. Ambulate as tolerated.           2. Acute blood loss anemia: Hb was 7.2-->6.9-->6.4 on 7/3/17. For transfusion of 2 units of PRBCs today     DVT Prophylaxis: Ambulate every shift  Code Status: Prior    Disposition: Expected discharge in 1 day.      Interval History   The pt denied having any right hip pain. He is tolerating rehab. No complaints of chest pain/sob/palpitations.    -Data reviewed today: I reviewed all new labs and imaging results over the last 24 hours. I personally reviewed no images or EKG's today.    Physical Exam   Temp: 97.9  F (36.6  C) Temp src: Oral BP: 128/56   Heart Rate: 83 Resp: 16 SpO2: 95 % O2 Device: None (Room air)    Vitals:    06/30/17 1203   Weight: 86.2 kg (190 lb)     Vital Signs with Ranges  Temp:  [97.5  F (36.4  C)-98.5  F (36.9  C)] 97.9  F (36.6  C)  Heart Rate:  [75-87] 83  Resp:  [16] 16  BP: (110-128)/(51-56) 128/56  SpO2:  [94 %-100 %] 95 %  I/O last 3 completed shifts:  In: 620 [P.O.:520; I.V.:100]  Out: 1200 [Urine:1200]    Constitutional: Elderly white male, awake, cooperative, no apparent distress, O2 Sats 97% on RA  Respiratory: BS vesicular bilaterally, no crackles or wheezing  Cardiovascular: S1 and S2, reg, no murmur noted  GI: Soft, non-distended, non-tender, no masses, BS present+  Skin/Integumen: No rashes  Extremities: Surgical dressing  over right hip    Medications        magnesium oxide  200 mg Oral QAM     lactobacillus rhamnosus (GG)  1 capsule Oral QAM     cefTRIAXone  2 g Intravenous Q24H     sodium chloride (PF)  3 mL Intracatheter Q8H     enoxaparin  40 mg Subcutaneous Q24H     acetaminophen  975 mg Oral Q8H     senna-docusate  1-2 tablet Oral BID       Data     Recent Labs  Lab 07/03/17  0656 07/02/17  1335 07/02/17  0623  07/01/17  0647  06/30/17  1621 06/30/17  1218   WBC  --   --  9.3  --   --   --   --   --    HGB 6.4* 7.0* 6.9*  < > 7.4*  < > 10.5* 12.6*   MCV  --   --  86  --   --   --   --   --      --  221  --   --   --   --  329   NA  --   --   --   --  137  --  139  --    POTASSIUM  --   --   --   --  4.2  --  4.1  --    CHLORIDE  --   --   --   --  105  --   --   --    CO2  --   --   --   --  24  --   --   --    BUN  --   --   --   --  17  --   --   --    CR 0.69  --   --   --  0.85  --   --  0.68   ANIONGAP  --   --   --   --  8  --   --   --    IVANNA  --   --   --   --  7.6*  --   --   --    GLC  --   --  105*  --  128*  --   --   --    < > = values in this interval not displayed.    No results found for this or any previous visit (from the past 24 hour(s)).

## 2017-07-04 LAB
BACTERIA SPEC CULT: NO GROWTH
Lab: NORMAL
MICRO REPORT STATUS: NORMAL
SPECIMEN SOURCE: NORMAL

## 2017-07-04 NOTE — PROGRESS NOTES
Discharge instructions reviewed w/ pt and pt's spouse.  Discharge meds given to pt.  No further questions.  Pain 0-1/10.  2nd unit of blood transfused, tolerated well.  Discharged to home via wheelchair, transport by family.

## 2017-07-05 LAB
BACTERIA SPEC CULT: ABNORMAL
BACTERIA SPEC CULT: NO GROWTH
BACTERIA SPEC CULT: NO GROWTH
Lab: NORMAL
MICRO REPORT STATUS: ABNORMAL
MICRO REPORT STATUS: NORMAL
MICRO REPORT STATUS: NORMAL
MICROORGANISM SPEC CULT: ABNORMAL
SPECIMEN SOURCE: ABNORMAL
SPECIMEN SOURCE: NORMAL
SPECIMEN SOURCE: NORMAL

## 2017-07-06 NOTE — DISCHARGE SUMMARY
"Discharge Summary    Nhan Marc MRN# [id number]   YOB: 1945 Age: 72 year old     Date of Admission: 6/30/2017    Date of Discharge: 7/3/2017    Reason for Admission: Nhan Marc is an 72 year old male who was admitted to the hospital following surgery.    Preoperative Diagnosis: FAILED RIGHT TOTAL HIP     Postoperative Diagnosis: FAILED RIGHT TOTAL HIP     Procedure Completed:  Explantation total hip arthroplasty, I&D hip, placement of antibiotic spacer    Hospital Course:  Mr. Marc was admitted and underwent the above procedure. The patient tolerated the procedure well. There were no complications. Following surgery he was admitted to the floor.  During his stay he did require blood transfusion for low hemoglobin.  He was placed on IV antibiotics and had a PICC line placed. His pain was controlled with oral pain medication.  During his stay he progressed well in physical therapy and all the therapy goals were met.     Discharge Physical Exam:  /83  Pulse 87  Temp 98.7  F (37.1  C) (Oral)  Resp 16  Ht 1.753 m (5' 9\")  Wt 86.2 kg (190 lb)  SpO2 96%  BMI 28.06 kg/m2  Neurovascularly intact, distal pulses present bilaterally.  Calves are negative bilaterally, both soft and nontender.    Assessment: Mr. Marc is stable status post explantaiton total hip and placement of antibiotic spacer.    Plan: We will discharge him home on analgesics and deep venous thrombosis prophylaxis.  He will follow-up with me approximately 2 weeks from surgery.  He may call [phone] to schedule an appointment.    Meds:   Nhan Marc   Home Medication Instructions TORI:[id number]    Printed on:07/06/17 4365   Medication Information                      ALPHA LIPOIC ACID PO  Take 1 tablet by mouth every morning OTC- unknown strength             Ascorbic Acid (VITAMIN C PO)  Take 500 mg by mouth every morning             aspirin  MG EC tablet  Take 1 tablet (325 mg) by mouth 2 times " daily             cefTRIAXone (ROCEPHIN) 1 GM vial  Inject 2 g (2,000 mg) into the vein daily CBC with differential, creatinine, SGOT weekly while on this medication to be faxed to Dr. Benoit office.             COENZYME Q-10 PO  Take 100 mg by mouth every morning              MAGNESIUM OXIDE PO  Take 250 mg by mouth every morning             Misc Natural Products (TART CHERRY ADVANCED PO)  Take 1 capsule by mouth every morning             multivitamin, therapeutic with minerals (THERA-VIT-M) TABS  Take 1 tablet by mouth every morning              Nutritional Supplements (NUTRITIONAL SUPPLEMENT PO)  Take 1 tablet by mouth 2 times daily (with meals) (breakfast and dinner)             oxyCODONE (ROXICODONE) 5 MG IR tablet  1 tab PO Q4 hours PRN pain 1-5, 2 tab PO Q4 hours PRN pain 6-10             Probiotic Product (PROBIOTIC PO)  Take 1 capsule by mouth every morning              senna-docusate (SENOKOT-S;PERICOLACE) 8.6-50 MG per tablet  Take 1-2 tablets by mouth 2 times daily as needed for constipation             VITAMIN D, CHOLECALCIFEROL, PO  Take 1 tablet by mouth every morning

## 2017-07-07 LAB
BACTERIA SPEC CULT: NORMAL
Lab: NORMAL
MICRO REPORT STATUS: NORMAL
SPECIMEN SOURCE: NORMAL

## 2017-07-08 LAB
BACTERIA SPEC CULT: ABNORMAL
BACTERIA SPEC CULT: NORMAL
Lab: ABNORMAL
Lab: NORMAL
MICRO REPORT STATUS: ABNORMAL
MICRO REPORT STATUS: NORMAL
MICROORGANISM SPEC CULT: ABNORMAL
SPECIMEN SOURCE: ABNORMAL
SPECIMEN SOURCE: NORMAL

## 2017-08-01 ENCOUNTER — TRANSFERRED RECORDS (OUTPATIENT)
Dept: HEALTH INFORMATION MANAGEMENT | Facility: CLINIC | Age: 72
End: 2017-08-01

## 2017-08-19 ENCOUNTER — TRANSFERRED RECORDS (OUTPATIENT)
Dept: HEALTH INFORMATION MANAGEMENT | Facility: CLINIC | Age: 72
End: 2017-08-19

## 2017-09-02 NOTE — OP NOTE
DATE OF PROCEDURE:  06/30/2017      PREOPERATIVE DIAGNOSIS:  Painful right total hip arthroplasty.      POSTOPERATIVE DIAGNOSIS:  Infected total hip arthroplasty.      PROCEDURES:   1.  Right hip explantation total hip arthroplasty, placement of antibiotic spacers.     2.  Irrigation and debridement.      SURGEON:  Selvin Witt MD      ASSISTANT:   JV IRAHETA PA-C        DESCRIPTION OF PROCEDURE:  Nhan Marc was brought to the operating room.  After satisfactory anesthesia, the patient was placed in lateral decubitus position.  Right lower extremity was prepped and draped in the usual sterile fashion.  The patient received antibiotics on a weight based basis prior immediately preoperatively.      The straight lateral incision was made.  Dissection was carried down to the fascia.  The fascia was split longitudinally.  Abductors were identified and the anterior one-third of the abductors were reflected in standard fashion.  Dissection was carried down to the hip capsule.  Capsulotomy was performed.  As the capsulotomy was performed significant purulent fluid was noted, consistent with deep infection.  The hip was then dislocated.  The hip was exposed.  The cup was grossly loose.  The femoral component itself was exposed and with multiple K-wires then osteotomes could be loosened and the femoral component was removed after debonding the component from the underlying bone.  There is no significant bony destruction with removal of the femoral component.  There is no purulence which was found within the femoral canal or in the region of the femoral stem.  This allowed exposure of the acetabulum.  The acetabulum did have purulent material associated with the acetabular component.  The polyethylene component was removed and screws were then removed.  The acetabulum was curetted and debrided.  It was also reamed.  There was good bony bleeding surface.  Soft tissue debridement was performed as well.  The tissue specimens  were sent to pathology which did show acute inflammation.  It was elected to place antibiotic-impregnated spacer.  Following this, the Prostalac acetabular component and femoral components were prepared with antibiotic-impregnated cement.  They were prepared and then impacted into the femur.  The acetabular component was cemented into place and the hip was then reduced.  This gave a stable configuration to the hip.  After again thorough debridement of any infected tissue the wound was then closed in sequential layers with interrupted suture in the abductors, running suture in the fascia, interrupted PDS suture followed by Monocryl and staples.  Sterile dressing was applied.  The patient left the operating room in satisfactory condition.         LAURA WITT MD             D: 2017 11:14   T: 2017 12:21   MT: [id number]      Name:     SANDY MONTANO   MRN:      [id number]        Account:        [id number]   :      1945           Procedure Date: 2017      Document: [id number]       cc: Laura Witt MD

## 2017-09-05 ENCOUNTER — ANESTHESIA EVENT (OUTPATIENT)
Dept: SURGERY | Facility: CLINIC | Age: 72
DRG: 468 | End: 2017-09-05
Payer: MEDICARE

## 2017-09-06 ENCOUNTER — APPOINTMENT (OUTPATIENT)
Dept: GENERAL RADIOLOGY | Facility: CLINIC | Age: 72
DRG: 468 | End: 2017-09-06
Attending: ORTHOPAEDIC SURGERY
Payer: MEDICARE

## 2017-09-06 ENCOUNTER — HOSPITAL ENCOUNTER (INPATIENT)
Facility: CLINIC | Age: 72
LOS: 3 days | Discharge: HOME | DRG: 468 | End: 2017-09-09
Attending: ORTHOPAEDIC SURGERY | Admitting: ORTHOPAEDIC SURGERY
Payer: MEDICARE

## 2017-09-06 ENCOUNTER — ANESTHESIA (OUTPATIENT)
Dept: SURGERY | Facility: CLINIC | Age: 72
DRG: 468 | End: 2017-09-06
Payer: MEDICARE

## 2017-09-06 DIAGNOSIS — Z96.649 S/P REVISION OF TOTAL HIP: Primary | ICD-10-CM

## 2017-09-06 LAB
ABO + RH BLD: NORMAL
ABO + RH BLD: NORMAL
BLD GP AB SCN SERPL QL: NORMAL
BLD PROD TYP BPU: NORMAL
BLD UNIT ID BPU: 0
BLD UNIT ID BPU: 0
BLOOD BANK CMNT PATIENT-IMP: NORMAL
BLOOD PRODUCT CODE: NORMAL
BLOOD PRODUCT CODE: NORMAL
BPU ID: NORMAL
BPU ID: NORMAL
CREAT SERPL-MCNC: 0.78 MG/DL (ref 0.66–1.25)
GFR SERPL CREATININE-BSD FRML MDRD: >90 ML/MIN/1.7M2
GRAM STN SPEC: NORMAL
HGB BLD-MCNC: 12.9 G/DL (ref 13.3–17.7)
Lab: NORMAL
NUM BPU REQUESTED: 2
PLATELET # BLD AUTO: 257 10E9/L (ref 150–450)
SPECIMEN EXP DATE BLD: NORMAL
SPECIMEN SOURCE: NORMAL
TRANSFUSION STATUS PATIENT QL: NORMAL

## 2017-09-06 PROCEDURE — 40000170 ZZH STATISTIC PRE-PROCEDURE ASSESSMENT II: Performed by: ORTHOPAEDIC SURGERY

## 2017-09-06 PROCEDURE — 71000013 ZZH RECOVERY PHASE 1 LEVEL 1 EA ADDTL HR: Performed by: ORTHOPAEDIC SURGERY

## 2017-09-06 PROCEDURE — 25000128 H RX IP 250 OP 636: Performed by: ORTHOPAEDIC SURGERY

## 2017-09-06 PROCEDURE — 86850 RBC ANTIBODY SCREEN: CPT | Performed by: PHYSICIAN ASSISTANT

## 2017-09-06 PROCEDURE — 71000012 ZZH RECOVERY PHASE 1 LEVEL 1 FIRST HR: Performed by: ORTHOPAEDIC SURGERY

## 2017-09-06 PROCEDURE — 25800025 ZZH RX 258: Performed by: ORTHOPAEDIC SURGERY

## 2017-09-06 PROCEDURE — 27210794 ZZH OR GENERAL SUPPLY STERILE: Performed by: ORTHOPAEDIC SURGERY

## 2017-09-06 PROCEDURE — 25000132 ZZH RX MED GY IP 250 OP 250 PS 637: Mod: GY | Performed by: PHYSICIAN ASSISTANT

## 2017-09-06 PROCEDURE — 88331 PATH CONSLTJ SURG 1 BLK 1SPC: CPT | Mod: 26 | Performed by: ORTHOPAEDIC SURGERY

## 2017-09-06 PROCEDURE — A9270 NON-COVERED ITEM OR SERVICE: HCPCS | Mod: GY | Performed by: PHYSICIAN ASSISTANT

## 2017-09-06 PROCEDURE — 40000985 XR PELVIS PORT 1/2 VW

## 2017-09-06 PROCEDURE — P9016 RBC LEUKOCYTES REDUCED: HCPCS | Performed by: PHYSICIAN ASSISTANT

## 2017-09-06 PROCEDURE — 25000128 H RX IP 250 OP 636: Performed by: NURSE ANESTHETIST, CERTIFIED REGISTERED

## 2017-09-06 PROCEDURE — 25000125 ZZHC RX 250: Performed by: NURSE ANESTHETIST, CERTIFIED REGISTERED

## 2017-09-06 PROCEDURE — A9270 NON-COVERED ITEM OR SERVICE: HCPCS | Mod: GY | Performed by: ORTHOPAEDIC SURGERY

## 2017-09-06 PROCEDURE — 36000067 ZZH SURGERY LEVEL 5 1ST 30 MIN: Performed by: ORTHOPAEDIC SURGERY

## 2017-09-06 PROCEDURE — C1713 ANCHOR/SCREW BN/BN,TIS/BN: HCPCS | Performed by: ORTHOPAEDIC SURGERY

## 2017-09-06 PROCEDURE — C1776 JOINT DEVICE (IMPLANTABLE): HCPCS | Performed by: ORTHOPAEDIC SURGERY

## 2017-09-06 PROCEDURE — 0SR901A REPLACEMENT OF RIGHT HIP JOINT WITH METAL SYNTHETIC SUBSTITUTE, UNCEMENTED, OPEN APPROACH: ICD-10-PCS | Performed by: ORTHOPAEDIC SURGERY

## 2017-09-06 PROCEDURE — 25000125 ZZHC RX 250: Performed by: ORTHOPAEDIC SURGERY

## 2017-09-06 PROCEDURE — P9041 ALBUMIN (HUMAN),5%, 50ML: HCPCS | Performed by: NURSE ANESTHETIST, CERTIFIED REGISTERED

## 2017-09-06 PROCEDURE — 40000986 XR PELVIS AD HIP PORTABLE RIGHT 1 VIEW

## 2017-09-06 PROCEDURE — 87075 CULTR BACTERIA EXCEPT BLOOD: CPT | Performed by: ORTHOPAEDIC SURGERY

## 2017-09-06 PROCEDURE — 87070 CULTURE OTHR SPECIMN AEROBIC: CPT | Performed by: ORTHOPAEDIC SURGERY

## 2017-09-06 PROCEDURE — 12000007 ZZH R&B INTERMEDIATE

## 2017-09-06 PROCEDURE — 36000069 ZZH SURGERY LEVEL 5 EA 15 ADDTL MIN: Performed by: ORTHOPAEDIC SURGERY

## 2017-09-06 PROCEDURE — 84295 ASSAY OF SERUM SODIUM: CPT

## 2017-09-06 PROCEDURE — 37000008 ZZH ANESTHESIA TECHNICAL FEE, 1ST 30 MIN: Performed by: ORTHOPAEDIC SURGERY

## 2017-09-06 PROCEDURE — 82803 BLOOD GASES ANY COMBINATION: CPT

## 2017-09-06 PROCEDURE — 87205 SMEAR GRAM STAIN: CPT | Performed by: ORTHOPAEDIC SURGERY

## 2017-09-06 PROCEDURE — 25000128 H RX IP 250 OP 636: Performed by: PHYSICIAN ASSISTANT

## 2017-09-06 PROCEDURE — 27210995 ZZH RX 272: Performed by: ORTHOPAEDIC SURGERY

## 2017-09-06 PROCEDURE — 88305 TISSUE EXAM BY PATHOLOGIST: CPT | Mod: 26 | Performed by: ORTHOPAEDIC SURGERY

## 2017-09-06 PROCEDURE — 86900 BLOOD TYPING SEROLOGIC ABO: CPT | Performed by: PHYSICIAN ASSISTANT

## 2017-09-06 PROCEDURE — 86923 COMPATIBILITY TEST ELECTRIC: CPT | Performed by: PHYSICIAN ASSISTANT

## 2017-09-06 PROCEDURE — 37000009 ZZH ANESTHESIA TECHNICAL FEE, EACH ADDTL 15 MIN: Performed by: ORTHOPAEDIC SURGERY

## 2017-09-06 PROCEDURE — 88305 TISSUE EXAM BY PATHOLOGIST: CPT | Performed by: ORTHOPAEDIC SURGERY

## 2017-09-06 PROCEDURE — 84132 ASSAY OF SERUM POTASSIUM: CPT

## 2017-09-06 PROCEDURE — 25000128 H RX IP 250 OP 636: Performed by: ANESTHESIOLOGY

## 2017-09-06 PROCEDURE — 99222 1ST HOSP IP/OBS MODERATE 55: CPT | Performed by: HOSPITALIST

## 2017-09-06 PROCEDURE — 85018 HEMOGLOBIN: CPT | Performed by: PHYSICIAN ASSISTANT

## 2017-09-06 PROCEDURE — 25000132 ZZH RX MED GY IP 250 OP 250 PS 637: Mod: GY | Performed by: ORTHOPAEDIC SURGERY

## 2017-09-06 PROCEDURE — 25000132 ZZH RX MED GY IP 250 OP 250 PS 637: Mod: GY | Performed by: HOSPITALIST

## 2017-09-06 PROCEDURE — 0SP908Z REMOVAL OF SPACER FROM RIGHT HIP JOINT, OPEN APPROACH: ICD-10-PCS | Performed by: ORTHOPAEDIC SURGERY

## 2017-09-06 PROCEDURE — 25000566 ZZH SEVOFLURANE, EA 15 MIN: Performed by: ORTHOPAEDIC SURGERY

## 2017-09-06 PROCEDURE — 85014 HEMATOCRIT: CPT

## 2017-09-06 PROCEDURE — 88331 PATH CONSLTJ SURG 1 BLK 1SPC: CPT | Performed by: ORTHOPAEDIC SURGERY

## 2017-09-06 PROCEDURE — 82565 ASSAY OF CREATININE: CPT | Performed by: PHYSICIAN ASSISTANT

## 2017-09-06 PROCEDURE — 85049 AUTOMATED PLATELET COUNT: CPT | Performed by: PHYSICIAN ASSISTANT

## 2017-09-06 PROCEDURE — 86901 BLOOD TYPING SEROLOGIC RH(D): CPT | Performed by: PHYSICIAN ASSISTANT

## 2017-09-06 PROCEDURE — 25000125 ZZHC RX 250: Performed by: PHYSICIAN ASSISTANT

## 2017-09-06 PROCEDURE — 36415 COLL VENOUS BLD VENIPUNCTURE: CPT | Performed by: PHYSICIAN ASSISTANT

## 2017-09-06 PROCEDURE — A9270 NON-COVERED ITEM OR SERVICE: HCPCS | Mod: GY | Performed by: HOSPITALIST

## 2017-09-06 PROCEDURE — 99207 ZZC CONSULT E&M CHANGED TO INITIAL LEVEL: CPT | Performed by: HOSPITALIST

## 2017-09-06 DEVICE — IMPLANTABLE DEVICE: Type: IMPLANTABLE DEVICE | Site: ACETABULUM | Status: FUNCTIONAL

## 2017-09-06 DEVICE — IMP APEX HOLE ELIMINATOR HIP DEPUY DURALOC 1246-03-000: Type: IMPLANTABLE DEVICE | Site: ACETABULUM | Status: FUNCTIONAL

## 2017-09-06 DEVICE — IMP SCR DEPUY PINNACLE CANC 6.5X15MM 1217-15-500: Type: IMPLANTABLE DEVICE | Site: ACETABULUM | Status: FUNCTIONAL

## 2017-09-06 DEVICE — IMP SCR BONE CAN ACE 6.5X25MM 1217-25-500: Type: IMPLANTABLE DEVICE | Site: ACETABULUM | Status: FUNCTIONAL

## 2017-09-06 RX ORDER — CEFAZOLIN SODIUM 1 G/3ML
1 INJECTION, POWDER, FOR SOLUTION INTRAMUSCULAR; INTRAVENOUS SEE ADMIN INSTRUCTIONS
Status: DISCONTINUED | OUTPATIENT
Start: 2017-09-06 | End: 2017-09-06 | Stop reason: HOSPADM

## 2017-09-06 RX ORDER — OXYCODONE HYDROCHLORIDE 5 MG/1
5-10 TABLET ORAL
Status: DISCONTINUED | OUTPATIENT
Start: 2017-09-06 | End: 2017-09-09 | Stop reason: HOSPADM

## 2017-09-06 RX ORDER — BUPIVACAINE HYDROCHLORIDE AND EPINEPHRINE 5; 5 MG/ML; UG/ML
INJECTION, SOLUTION PERINEURAL PRN
Status: DISCONTINUED | OUTPATIENT
Start: 2017-09-06 | End: 2017-09-06 | Stop reason: HOSPADM

## 2017-09-06 RX ORDER — CHLORHEXIDINE GLUCONATE 40 MG/ML
SOLUTION TOPICAL ONCE
Status: DISCONTINUED | OUTPATIENT
Start: 2017-09-06 | End: 2017-09-06 | Stop reason: HOSPADM

## 2017-09-06 RX ORDER — SODIUM CHLORIDE, SODIUM LACTATE, POTASSIUM CHLORIDE, CALCIUM CHLORIDE 600; 310; 30; 20 MG/100ML; MG/100ML; MG/100ML; MG/100ML
INJECTION, SOLUTION INTRAVENOUS CONTINUOUS
Status: DISCONTINUED | OUTPATIENT
Start: 2017-09-06 | End: 2017-09-06 | Stop reason: HOSPADM

## 2017-09-06 RX ORDER — SODIUM CHLORIDE, SODIUM LACTATE, POTASSIUM CHLORIDE, CALCIUM CHLORIDE 600; 310; 30; 20 MG/100ML; MG/100ML; MG/100ML; MG/100ML
INJECTION, SOLUTION INTRAVENOUS CONTINUOUS PRN
Status: DISCONTINUED | OUTPATIENT
Start: 2017-09-06 | End: 2017-09-06

## 2017-09-06 RX ORDER — NALOXONE HYDROCHLORIDE 0.4 MG/ML
.1-.4 INJECTION, SOLUTION INTRAMUSCULAR; INTRAVENOUS; SUBCUTANEOUS
Status: DISCONTINUED | OUTPATIENT
Start: 2017-09-06 | End: 2017-09-09 | Stop reason: HOSPADM

## 2017-09-06 RX ORDER — MEPERIDINE HYDROCHLORIDE 25 MG/ML
12.5 INJECTION INTRAMUSCULAR; INTRAVENOUS; SUBCUTANEOUS EVERY 5 MIN PRN
Status: DISCONTINUED | OUTPATIENT
Start: 2017-09-06 | End: 2017-09-06 | Stop reason: HOSPADM

## 2017-09-06 RX ORDER — CEFAZOLIN SODIUM 1 G/3ML
INJECTION, POWDER, FOR SOLUTION INTRAMUSCULAR; INTRAVENOUS PRN
Status: DISCONTINUED | OUTPATIENT
Start: 2017-09-06 | End: 2017-09-06

## 2017-09-06 RX ORDER — CEFAZOLIN SODIUM 2 G/100ML
2 INJECTION, SOLUTION INTRAVENOUS
Status: COMPLETED | OUTPATIENT
Start: 2017-09-06 | End: 2017-09-06

## 2017-09-06 RX ORDER — CEFAZOLIN SODIUM 2 G/100ML
2 INJECTION, SOLUTION INTRAVENOUS EVERY 8 HOURS
Status: COMPLETED | OUTPATIENT
Start: 2017-09-06 | End: 2017-09-07

## 2017-09-06 RX ORDER — FENTANYL CITRATE 50 UG/ML
INJECTION, SOLUTION INTRAMUSCULAR; INTRAVENOUS PRN
Status: DISCONTINUED | OUTPATIENT
Start: 2017-09-06 | End: 2017-09-06

## 2017-09-06 RX ORDER — GLYCOPYRROLATE 0.2 MG/ML
INJECTION, SOLUTION INTRAMUSCULAR; INTRAVENOUS PRN
Status: DISCONTINUED | OUTPATIENT
Start: 2017-09-06 | End: 2017-09-06

## 2017-09-06 RX ORDER — ACETAMINOPHEN 325 MG/1
650 TABLET ORAL EVERY 4 HOURS PRN
Status: DISCONTINUED | OUTPATIENT
Start: 2017-09-09 | End: 2017-09-09 | Stop reason: HOSPADM

## 2017-09-06 RX ORDER — ONDANSETRON 4 MG/1
4 TABLET, ORALLY DISINTEGRATING ORAL EVERY 30 MIN PRN
Status: DISCONTINUED | OUTPATIENT
Start: 2017-09-06 | End: 2017-09-06 | Stop reason: HOSPADM

## 2017-09-06 RX ORDER — COLCHICINE 0.6 MG/1
1.2 TABLET ORAL ONCE
Status: COMPLETED | OUTPATIENT
Start: 2017-09-06 | End: 2017-09-06

## 2017-09-06 RX ORDER — ONDANSETRON 2 MG/ML
4 INJECTION INTRAMUSCULAR; INTRAVENOUS EVERY 6 HOURS PRN
Status: DISCONTINUED | OUTPATIENT
Start: 2017-09-06 | End: 2017-09-09 | Stop reason: HOSPADM

## 2017-09-06 RX ORDER — NEOSTIGMINE METHYLSULFATE 1 MG/ML
VIAL (ML) INJECTION PRN
Status: DISCONTINUED | OUTPATIENT
Start: 2017-09-06 | End: 2017-09-06

## 2017-09-06 RX ORDER — PROCHLORPERAZINE MALEATE 5 MG
5 TABLET ORAL EVERY 6 HOURS PRN
Status: DISCONTINUED | OUTPATIENT
Start: 2017-09-06 | End: 2017-09-09 | Stop reason: HOSPADM

## 2017-09-06 RX ORDER — ALBUMIN, HUMAN INJ 5% 5 %
SOLUTION INTRAVENOUS CONTINUOUS PRN
Status: DISCONTINUED | OUTPATIENT
Start: 2017-09-06 | End: 2017-09-06

## 2017-09-06 RX ORDER — KETOROLAC TROMETHAMINE 15 MG/ML
15 INJECTION, SOLUTION INTRAMUSCULAR; INTRAVENOUS EVERY 6 HOURS
Status: DISPENSED | OUTPATIENT
Start: 2017-09-06 | End: 2017-09-07

## 2017-09-06 RX ORDER — DEXTROSE MONOHYDRATE, SODIUM CHLORIDE, AND POTASSIUM CHLORIDE 50; 1.49; 4.5 G/1000ML; G/1000ML; G/1000ML
INJECTION, SOLUTION INTRAVENOUS CONTINUOUS
Status: DISCONTINUED | OUTPATIENT
Start: 2017-09-06 | End: 2017-09-09 | Stop reason: HOSPADM

## 2017-09-06 RX ORDER — ONDANSETRON 4 MG/1
4 TABLET, ORALLY DISINTEGRATING ORAL EVERY 6 HOURS PRN
Status: DISCONTINUED | OUTPATIENT
Start: 2017-09-06 | End: 2017-09-09 | Stop reason: HOSPADM

## 2017-09-06 RX ORDER — FENTANYL CITRATE 50 UG/ML
25-50 INJECTION, SOLUTION INTRAMUSCULAR; INTRAVENOUS
Status: DISCONTINUED | OUTPATIENT
Start: 2017-09-06 | End: 2017-09-06 | Stop reason: HOSPADM

## 2017-09-06 RX ORDER — MAGNESIUM HYDROXIDE 1200 MG/15ML
LIQUID ORAL PRN
Status: DISCONTINUED | OUTPATIENT
Start: 2017-09-06 | End: 2017-09-06 | Stop reason: HOSPADM

## 2017-09-06 RX ORDER — ACETAMINOPHEN 325 MG/1
975 TABLET ORAL EVERY 8 HOURS
Status: DISCONTINUED | OUTPATIENT
Start: 2017-09-06 | End: 2017-09-09 | Stop reason: HOSPADM

## 2017-09-06 RX ORDER — PROPOFOL 10 MG/ML
INJECTION, EMULSION INTRAVENOUS PRN
Status: DISCONTINUED | OUTPATIENT
Start: 2017-09-06 | End: 2017-09-06

## 2017-09-06 RX ORDER — ACETAMINOPHEN 500 MG
1000 TABLET ORAL ONCE
Status: COMPLETED | OUTPATIENT
Start: 2017-09-06 | End: 2017-09-06

## 2017-09-06 RX ORDER — ONDANSETRON 2 MG/ML
4 INJECTION INTRAMUSCULAR; INTRAVENOUS EVERY 30 MIN PRN
Status: DISCONTINUED | OUTPATIENT
Start: 2017-09-06 | End: 2017-09-06 | Stop reason: HOSPADM

## 2017-09-06 RX ORDER — HYDROXYZINE HYDROCHLORIDE 10 MG/1
10 TABLET, FILM COATED ORAL EVERY 6 HOURS PRN
Status: DISCONTINUED | OUTPATIENT
Start: 2017-09-06 | End: 2017-09-09 | Stop reason: HOSPADM

## 2017-09-06 RX ORDER — LIDOCAINE HYDROCHLORIDE 20 MG/ML
INJECTION, SOLUTION INFILTRATION; PERINEURAL PRN
Status: DISCONTINUED | OUTPATIENT
Start: 2017-09-06 | End: 2017-09-06

## 2017-09-06 RX ORDER — VECURONIUM BROMIDE 1 MG/ML
INJECTION, POWDER, LYOPHILIZED, FOR SOLUTION INTRAVENOUS PRN
Status: DISCONTINUED | OUTPATIENT
Start: 2017-09-06 | End: 2017-09-06

## 2017-09-06 RX ORDER — ONDANSETRON 2 MG/ML
INJECTION INTRAMUSCULAR; INTRAVENOUS PRN
Status: DISCONTINUED | OUTPATIENT
Start: 2017-09-06 | End: 2017-09-06

## 2017-09-06 RX ORDER — TEMAZEPAM 7.5 MG/1
7.5 CAPSULE ORAL
Status: DISCONTINUED | OUTPATIENT
Start: 2017-09-07 | End: 2017-09-09 | Stop reason: HOSPADM

## 2017-09-06 RX ORDER — ALBUTEROL SULFATE 0.83 MG/ML
2.5 SOLUTION RESPIRATORY (INHALATION) EVERY 4 HOURS PRN
Status: DISCONTINUED | OUTPATIENT
Start: 2017-09-06 | End: 2017-09-06 | Stop reason: HOSPADM

## 2017-09-06 RX ORDER — AMOXICILLIN 250 MG
1-2 CAPSULE ORAL 2 TIMES DAILY
Status: DISCONTINUED | OUTPATIENT
Start: 2017-09-06 | End: 2017-09-09 | Stop reason: HOSPADM

## 2017-09-06 RX ORDER — CYCLOBENZAPRINE HCL 5 MG
5 TABLET ORAL 3 TIMES DAILY PRN
Status: DISCONTINUED | OUTPATIENT
Start: 2017-09-06 | End: 2017-09-09 | Stop reason: HOSPADM

## 2017-09-06 RX ORDER — KETOROLAC TROMETHAMINE 30 MG/ML
30 INJECTION, SOLUTION INTRAMUSCULAR; INTRAVENOUS ONCE
Status: DISCONTINUED | OUTPATIENT
Start: 2017-09-06 | End: 2017-09-06 | Stop reason: HOSPADM

## 2017-09-06 RX ORDER — HYDROMORPHONE HYDROCHLORIDE 1 MG/ML
.3-.5 INJECTION, SOLUTION INTRAMUSCULAR; INTRAVENOUS; SUBCUTANEOUS EVERY 5 MIN PRN
Status: DISCONTINUED | OUTPATIENT
Start: 2017-09-06 | End: 2017-09-06 | Stop reason: HOSPADM

## 2017-09-06 RX ORDER — LIDOCAINE 40 MG/G
CREAM TOPICAL
Status: DISCONTINUED | OUTPATIENT
Start: 2017-09-06 | End: 2017-09-09 | Stop reason: HOSPADM

## 2017-09-06 RX ORDER — HYDROMORPHONE HYDROCHLORIDE 1 MG/ML
.3-.5 INJECTION, SOLUTION INTRAMUSCULAR; INTRAVENOUS; SUBCUTANEOUS
Status: DISCONTINUED | OUTPATIENT
Start: 2017-09-06 | End: 2017-09-09 | Stop reason: HOSPADM

## 2017-09-06 RX ORDER — SODIUM CHLORIDE 9 MG/ML
INJECTION, SOLUTION INTRAVENOUS CONTINUOUS PRN
Status: DISCONTINUED | OUTPATIENT
Start: 2017-09-06 | End: 2017-09-06

## 2017-09-06 RX ADMIN — OXYCODONE HYDROCHLORIDE 10 MG: 5 TABLET ORAL at 20:50

## 2017-09-06 RX ADMIN — VECURONIUM BROMIDE 3 MG: 1 INJECTION, POWDER, LYOPHILIZED, FOR SOLUTION INTRAVENOUS at 12:07

## 2017-09-06 RX ADMIN — CEFAZOLIN SODIUM 2 G: 2 INJECTION, SOLUTION INTRAVENOUS at 11:30

## 2017-09-06 RX ADMIN — MIDAZOLAM HYDROCHLORIDE 2 MG: 1 INJECTION, SOLUTION INTRAMUSCULAR; INTRAVENOUS at 11:21

## 2017-09-06 RX ADMIN — NEOSTIGMINE METHYLSULFATE 4 MG: 1 INJECTION INTRAMUSCULAR; INTRAVENOUS; SUBCUTANEOUS at 14:32

## 2017-09-06 RX ADMIN — ALBUMIN (HUMAN): 12.5 SOLUTION INTRAVENOUS at 13:05

## 2017-09-06 RX ADMIN — ACETAMINOPHEN 1000 MG: 500 TABLET, FILM COATED ORAL at 10:06

## 2017-09-06 RX ADMIN — SODIUM CHLORIDE: 9 INJECTION, SOLUTION INTRAVENOUS at 13:19

## 2017-09-06 RX ADMIN — PHENYLEPHRINE HYDROCHLORIDE 100 MCG: 10 INJECTION, SOLUTION INTRAMUSCULAR; INTRAVENOUS; SUBCUTANEOUS at 14:01

## 2017-09-06 RX ADMIN — PHENYLEPHRINE HYDROCHLORIDE 100 MCG: 10 INJECTION, SOLUTION INTRAMUSCULAR; INTRAVENOUS; SUBCUTANEOUS at 14:58

## 2017-09-06 RX ADMIN — HYDROMORPHONE HYDROCHLORIDE 0.5 MG: 1 INJECTION, SOLUTION INTRAMUSCULAR; INTRAVENOUS; SUBCUTANEOUS at 14:33

## 2017-09-06 RX ADMIN — FENTANYL CITRATE 100 MCG: 50 INJECTION, SOLUTION INTRAMUSCULAR; INTRAVENOUS at 12:07

## 2017-09-06 RX ADMIN — PHENYLEPHRINE HYDROCHLORIDE 100 MCG: 10 INJECTION, SOLUTION INTRAMUSCULAR; INTRAVENOUS; SUBCUTANEOUS at 14:49

## 2017-09-06 RX ADMIN — TRANEXAMIC ACID 1 G: 100 INJECTION, SOLUTION INTRAVENOUS at 11:45

## 2017-09-06 RX ADMIN — PHENYLEPHRINE HYDROCHLORIDE 100 MCG: 10 INJECTION, SOLUTION INTRAMUSCULAR; INTRAVENOUS; SUBCUTANEOUS at 14:40

## 2017-09-06 RX ADMIN — CEFAZOLIN 1 G: 1 INJECTION, POWDER, FOR SOLUTION INTRAMUSCULAR; INTRAVENOUS at 15:34

## 2017-09-06 RX ADMIN — LIDOCAINE HYDROCHLORIDE 60 MG: 20 INJECTION, SOLUTION INFILTRATION; PERINEURAL at 11:25

## 2017-09-06 RX ADMIN — PHENYLEPHRINE HYDROCHLORIDE 100 MCG: 10 INJECTION, SOLUTION INTRAMUSCULAR; INTRAVENOUS; SUBCUTANEOUS at 12:13

## 2017-09-06 RX ADMIN — ROCURONIUM BROMIDE 50 MG: 10 INJECTION INTRAVENOUS at 11:25

## 2017-09-06 RX ADMIN — SODIUM CHLORIDE, POTASSIUM CHLORIDE, SODIUM LACTATE AND CALCIUM CHLORIDE: 600; 310; 30; 20 INJECTION, SOLUTION INTRAVENOUS at 10:06

## 2017-09-06 RX ADMIN — PHENYLEPHRINE HYDROCHLORIDE 100 MCG: 10 INJECTION, SOLUTION INTRAMUSCULAR; INTRAVENOUS; SUBCUTANEOUS at 11:59

## 2017-09-06 RX ADMIN — PHENYLEPHRINE HYDROCHLORIDE 100 MCG: 10 INJECTION, SOLUTION INTRAMUSCULAR; INTRAVENOUS; SUBCUTANEOUS at 15:15

## 2017-09-06 RX ADMIN — KETOROLAC TROMETHAMINE 15 MG: 15 INJECTION, SOLUTION INTRAMUSCULAR; INTRAVENOUS at 21:52

## 2017-09-06 RX ADMIN — ONDANSETRON 4 MG: 2 INJECTION INTRAMUSCULAR; INTRAVENOUS at 14:32

## 2017-09-06 RX ADMIN — PHENYLEPHRINE HYDROCHLORIDE 100 MCG: 10 INJECTION, SOLUTION INTRAMUSCULAR; INTRAVENOUS; SUBCUTANEOUS at 15:04

## 2017-09-06 RX ADMIN — ACETAMINOPHEN 975 MG: 325 TABLET, FILM COATED ORAL at 19:24

## 2017-09-06 RX ADMIN — HYDROMORPHONE HYDROCHLORIDE 0.5 MG: 1 INJECTION, SOLUTION INTRAMUSCULAR; INTRAVENOUS; SUBCUTANEOUS at 16:15

## 2017-09-06 RX ADMIN — PHENYLEPHRINE HYDROCHLORIDE 100 MCG: 10 INJECTION, SOLUTION INTRAMUSCULAR; INTRAVENOUS; SUBCUTANEOUS at 14:34

## 2017-09-06 RX ADMIN — SODIUM CHLORIDE, POTASSIUM CHLORIDE, SODIUM LACTATE AND CALCIUM CHLORIDE: 600; 310; 30; 20 INJECTION, SOLUTION INTRAVENOUS at 15:01

## 2017-09-06 RX ADMIN — SODIUM CHLORIDE, POTASSIUM CHLORIDE, SODIUM LACTATE AND CALCIUM CHLORIDE: 600; 310; 30; 20 INJECTION, SOLUTION INTRAVENOUS at 12:26

## 2017-09-06 RX ADMIN — PHENYLEPHRINE HYDROCHLORIDE 100 MCG: 10 INJECTION, SOLUTION INTRAMUSCULAR; INTRAVENOUS; SUBCUTANEOUS at 11:47

## 2017-09-06 RX ADMIN — ALBUMIN (HUMAN): 12.5 SOLUTION INTRAVENOUS at 13:42

## 2017-09-06 RX ADMIN — SODIUM CHLORIDE, POTASSIUM CHLORIDE, SODIUM LACTATE AND CALCIUM CHLORIDE: 600; 310; 30; 20 INJECTION, SOLUTION INTRAVENOUS at 11:30

## 2017-09-06 RX ADMIN — FENTANYL CITRATE 50 MCG: 50 INJECTION, SOLUTION INTRAMUSCULAR; INTRAVENOUS at 11:25

## 2017-09-06 RX ADMIN — CEFAZOLIN SODIUM 2 G: 2 INJECTION, SOLUTION INTRAVENOUS at 19:24

## 2017-09-06 RX ADMIN — PHENYLEPHRINE HYDROCHLORIDE 100 MCG: 10 INJECTION, SOLUTION INTRAMUSCULAR; INTRAVENOUS; SUBCUTANEOUS at 14:08

## 2017-09-06 RX ADMIN — PHENYLEPHRINE HYDROCHLORIDE 100 MCG: 10 INJECTION, SOLUTION INTRAMUSCULAR; INTRAVENOUS; SUBCUTANEOUS at 12:05

## 2017-09-06 RX ADMIN — GLYCOPYRROLATE 0.8 MG: 0.2 INJECTION, SOLUTION INTRAMUSCULAR; INTRAVENOUS at 14:32

## 2017-09-06 RX ADMIN — VECURONIUM BROMIDE 1 MG: 1 INJECTION, POWDER, LYOPHILIZED, FOR SOLUTION INTRAVENOUS at 13:57

## 2017-09-06 RX ADMIN — PHENYLEPHRINE HYDROCHLORIDE 100 MCG: 10 INJECTION, SOLUTION INTRAMUSCULAR; INTRAVENOUS; SUBCUTANEOUS at 15:22

## 2017-09-06 RX ADMIN — POTASSIUM CHLORIDE, DEXTROSE MONOHYDRATE AND SODIUM CHLORIDE: 150; 5; 450 INJECTION, SOLUTION INTRAVENOUS at 19:31

## 2017-09-06 RX ADMIN — SENNOSIDES AND DOCUSATE SODIUM 1 TABLET: 8.6; 5 TABLET ORAL at 20:50

## 2017-09-06 RX ADMIN — PHENYLEPHRINE HYDROCHLORIDE 100 MCG: 10 INJECTION, SOLUTION INTRAMUSCULAR; INTRAVENOUS; SUBCUTANEOUS at 13:38

## 2017-09-06 RX ADMIN — SODIUM CHLORIDE, POTASSIUM CHLORIDE, SODIUM LACTATE AND CALCIUM CHLORIDE: 600; 310; 30; 20 INJECTION, SOLUTION INTRAVENOUS at 11:21

## 2017-09-06 RX ADMIN — FENTANYL CITRATE 50 MCG: 50 INJECTION, SOLUTION INTRAMUSCULAR; INTRAVENOUS at 14:54

## 2017-09-06 RX ADMIN — PROPOFOL 200 MG: 10 INJECTION, EMULSION INTRAVENOUS at 11:25

## 2017-09-06 RX ADMIN — TRANEXAMIC ACID 1 G: 100 INJECTION, SOLUTION INTRAVENOUS at 15:04

## 2017-09-06 RX ADMIN — COLCHICINE 1.2 MG: 0.6 TABLET, FILM COATED ORAL at 19:32

## 2017-09-06 RX ADMIN — PHENYLEPHRINE HYDROCHLORIDE 100 MCG: 10 INJECTION, SOLUTION INTRAMUSCULAR; INTRAVENOUS; SUBCUTANEOUS at 14:19

## 2017-09-06 RX ADMIN — CEFAZOLIN 1 G: 1 INJECTION, POWDER, FOR SOLUTION INTRAMUSCULAR; INTRAVENOUS at 13:30

## 2017-09-06 RX ADMIN — HYDROMORPHONE HYDROCHLORIDE 0.5 MG: 1 INJECTION, SOLUTION INTRAMUSCULAR; INTRAVENOUS; SUBCUTANEOUS at 15:57

## 2017-09-06 RX ADMIN — PHENYLEPHRINE HYDROCHLORIDE 100 MCG: 10 INJECTION, SOLUTION INTRAMUSCULAR; INTRAVENOUS; SUBCUTANEOUS at 14:25

## 2017-09-06 RX ADMIN — FENTANYL CITRATE 50 MCG: 50 INJECTION, SOLUTION INTRAMUSCULAR; INTRAVENOUS at 12:49

## 2017-09-06 ASSESSMENT — LIFESTYLE VARIABLES: TOBACCO_USE: 1

## 2017-09-06 NOTE — ANESTHESIA CARE TRANSFER NOTE
Patient: Nhan Marc    Procedure(s):  RIGHT TOTAL HIP REVISION - Wound Class: I-Clean    Diagnosis: djd  Diagnosis Additional Information: No value filed.    Anesthesia Type:   General, ETT     Note:  Airway :Nasal Cannula  Patient transferred to:PACU        Vitals: (Last set prior to Anesthesia Care Transfer)    CRNA VITALS  9/6/2017 1518 - 9/6/2017 1553      9/6/2017             Pulse: 76    SpO2: 97 %    Resp Rate (set): 10                Electronically Signed By: WALDEMAR Wolf CRNA  September 6, 2017  3:53 PM

## 2017-09-06 NOTE — IP AVS SNAPSHOT
[address] [facility]    [address] [place] [address]    Phone:  [phone]                                       After Visit Summary   9/6/2017    Nhan Marc    MRN: [id number]           After Visit Summary Signature Page     I have received my discharge instructions, and my questions have been answered. I have discussed any challenges I see with this plan with the nurse or doctor.    ..........................................................................................................................................  Patient/Patient Representative Signature      ..........................................................................................................................................  Patient Representative Print Name and Relationship to Patient    ..................................................               ................................................  Date                                            Time    ..........................................................................................................................................  Reviewed by Signature/Title    ...................................................              ..............................................  Date                                                            Time

## 2017-09-06 NOTE — PROGRESS NOTES
Admission medication history interview status for the 9/6/2017  admission is complete. See EPIC admission navigator for prior to admission medications     Medication history source reliability:Good    Medication history interview source(s):Patient    Medication history resources (including written lists, pill bottles, clinic record):None    Primary pharmacy.[facility]    Additional medication history information not noted on PTA med list :None    Time spent in this activity: 40 minutes    Prior to Admission medications    Medication Sig Last Dose Taking? Auth Provider   Nutritional Supplements (NUTRITIONAL SUPPLEMENT PO) Take 1 tablet by mouth 3 times daily (with meals) GOUT CONTROL 9/5/2017 at 1200 Yes Reported, Patient   OXYCODONE HCL PO Take 5 mg by mouth daily as needed (Gout Pain) 9/6/2017 at 0100 Yes Reported, Patient   Acetaminophen (TYLENOL PO) Take 1,000 mg by mouth 3 times daily as needed for mild pain or fever 9/6/2017 at 0100 Yes Reported, Patient   Colchicine (COLCRYS PO) Take 0.6 mg by mouth every 2 hours as needed for moderate pain July 2017 at PRN Yes Reported, Patient   Nutritional Supplements (NUTRITIONAL SUPPLEMENT PO) Take 1 tablet by mouth 2 times daily (with meals) (breakfast and lunch) URIC ACID CONTROL 8/30/2017 at Noon Yes Reported, Patient   Misc Natural Products (TART CHERRY ADVANCED PO) Take 1 capsule by mouth every morning 8/30/2017 at AM Yes Reported, Patient   VITAMIN D, CHOLECALCIFEROL, PO Take 1 tablet by mouth every morning 8/30/2017 at AM Yes Reported, Patient   MAGNESIUM OXIDE PO Take 250 mg by mouth every morning 8/30/2017 at AM Yes Reported, Patient   Ascorbic Acid (VITAMIN C PO) Take 500 mg by mouth every morning 8/30/2017 at AM Yes Reported, Patient   ALPHA LIPOIC ACID PO Take 1 tablet by mouth every morning OTC- unknown strength 8/30/2017 at AM Yes Reported, Patient   COENZYME Q-10 PO Take 100 mg by mouth every morning  8/23/2017 at AM Yes Reported, Patient   multivitamin,  therapeutic with minerals (THERA-VIT-M) TABS Take 1 tablet by mouth every morning  8/30/2017 at AM Yes Reported, Patient   Probiotic Product (PROBIOTIC PO) Take 1 capsule by mouth every morning  8/30/2017 at AM Yes Reported, Patient

## 2017-09-06 NOTE — BRIEF OP NOTE
[facility] Brief Operative Note    Pre-operative diagnosis: Antibiotic spacer due to infected total hip arthroplasty   Post-operative diagnosis same   Procedure: Procedure(s):  RIGHT TOTAL HIP REVISION - Wound Class: I-Clean   Surgeon(s): Surgeon(s) and Role:     * Selvin Witt MD - Primary     * Lexi Okeefe PA-C - Assisting   Estimated blood loss: 1400 mL    Specimens:   ID Type Source Tests Collected by Time Destination   1 : Right Hip Swab Fluid Hip, Right ANAEROBIC BACTERIAL CULTURE, FLUID CULTURE AEROBIC BACTERIAL, GRAM STAIN Selvin Witt MD 9/6/2017  1:45 PM    A : Right Hip Re-Implantation Tissue Other SURGICAL PATHOLOGY EXAM Selvin Witt MD 9/6/2017  1:03 PM       Findings:

## 2017-09-06 NOTE — IP AVS SNAPSHOT
MRN:[id number]                      After Visit Summary   9/6/2017    Nhan Marc    MRN: [id number]           Thank you!     Thank you for choosing [facility] for your care. Our goal is always to provide you with excellent care. Hearing back from our patients is one way we can continue to improve our services. Please take a few minutes to complete the written survey that you may receive in the mail after you visit with us. Thank you!        Patient Information     Date Of Birth          1945        Designated Caregiver       Most Recent Value    Caregiver    Will someone help with your care after discharge? yes    Name of designated caregiver trey     Phone number of caregiver     Caregiver address [address] [place]       About your hospital stay     You were admitted on:  September 6, 2017 You last received care in the:  [place] [address] [facility]    You were discharged on:  September 9, 2017        Reason for your hospital stay       Revision hip replacement                  Who to Call     For medical emergencies, please call 911.  For non-urgent questions about your medical care, please call your primary care provider or clinic, [phone]  For questions related to your surgery, please call your surgery clinic        Attending Provider     Provider Selvin Dupree MD Orthopedics       Primary Care Provider Office Phone # Fax #    Kole Parker [phone]       When to contact your care team       EMERGENCY CARE PLAN     1. I have questions or concerns during clinic hours:   - I will call the clinic directly at [phone] and and speak with Dr. Joseph Barajas's care coordinator.     2. I have questions or concerns outside clinic hours:   - I will call the clinic directly at [phone] and speak with the doctor on-call.     3. I need to schedule an appointment:   - I will call the clinic directly at  [phone] for [place] appointments or [phone] for [place] appointments.     4. I am concerned about symptoms that I am having and think I need same day treatment:   - During clinic hours, I will call the clinic first at [phone] and speak with Jenn.   - She will either make an appointment with Dr. Ruiz or she will direct you to come in to our walk-in urgent care clinic.   - The urgent care is open 7 days a week from 8:00am - 8:00pm at all of our locations.     - After clinic hours, I will call the clinic first at [phone] and speak with the on-call doctor.   - You should NOT go to the emergency room or a urgent care with out being directed to do so by the clinic.                  After Care Instructions     Activity       Your activity upon discharge: activity as tolerated in the brace.  You should work on home exercises provided by the physical therapist at the hospital 2-3 times a day.     Brace: You should wear the brace at all times during the day and for activity.  You do not need to wear the brace at night while sleeping or to shower.    Physical Therapy: Once you leave the hospital you will not need outpatient physical therapy for your hip.  Walking is the best exercise after a hip replacement.  Do as much walking as you feel comfortable doing  You should avoid any twisting or torquing of the hip (no hokie pokie) as well as avoid lifting your leg up out to the side.  You should also avoid any kind of strengthening exercises of the hip and leg for the first 8 weeks after surgery.     Assistive Ambulatory Devices:  Use your walker/crutches until Dr. Ruiz instructs you to advance to a cane.  You will use the cane in the hand opposite your surgery.  You can then advance from the cane as you feel comfortable.     Elevate: Swelling in the leg is normal after a hip replacement.  By keeping your leg elevated with a pillow under your calf, not under the knee, will help with the swelling.  The  best position is to have the knee above the level of your heart and your ankle above the level of your knee.  Wearing the compression stockings (Mason hose) can help reduce swelling.  You should wear these until you are seen at Dr. Ruiz's office post operatively.  You can remove these twice a day for laundering and hygiene.  The swelling in the leg will be present for at least 4-6 weeks.       Ice: Ice the hip 4-5 times a day for 20-30 minutes at a time.  Icing will help reduce swelling and pain.     Pain control: Take the pain medication and/or anti-inflammatory medication as prescribed.  Don't let your pain become severe.            Diet       Follow this diet upon discharge: Regular            Discharge Instructions       Upon leaving the hospital you will be discharged with a few different medications:     1. Aspirin 325mg - you will be taking one tablet twice a day for 5 weeks following surgery for a blood thinner to help prevent blood clots.   2. Oxycodone 5mg - this is a pain medication.  You can take one or two tablets every four to six hours.  You will need this medication the longest to sleep at night and for physical therapy.  You can wean yourself from this medication as you are able by either increasing the time between tablets or going down to one tablet if you are taking two at a time.    **REMINDER: If you need a refill of your pain medication prior to your first post-operative appointment please contact our office and allow 24 to 48 hours for refills to be processed. Any refills needed before the weekend will need to be submitted on Thursday.  Also, all narcotic pain medication cannot be called in to the pharmacy and will need to be picked up at one of our clinics ([place] or [place]), this is a Federal Law.  You or a family member will need to allow for time to come to our office to pick these up.  Please let us know who will be picking up the prescription as that person will need to show a photo  ID to get the prescription.**    3. Senokot - this is a stool softener.  You can take one or two pills twice a day as needed for constipation.  You should take this medication as long as you are taking narcotic pain medication and as long as you do not have diarrhea.  As you are more active and taking less pain medication you will be able to stop using this.     Medication you should already have at home and to start the day after you get home from the hospital:   1. Celebrex 200mg - this is an anti-inflammatory medication you will be taking one tablet daily with your morning meal. This medication will help with the pain and swelling in your hip and leg.  You should not take another anti-inflammatory medication (i.e. Ibuprofen, advil, aleve, etc) while taking celebrex.  You can take tylenol with Celebrex.  You should have already gotten a prescription for this medication and filled it prior to surgery.  You can start this medication the day after you get home from the hospital.     Other medications not prescribed:   1. Tylenol - you can take Tylenol in addition to the pain medication.  Do not exceed 3,000mg in a day.   2. Ibuprofen - we would like you to avoid taking ibuprofen while you are taking the aspirin.   3. Iron - we typically do not prescribe an iron supplement pill after surgery however, if you want to take one we recommend 324 or 325mg daily.  These pills will make you more constipated.     Please contact Dr. Ruiz's office with any questions.  You can either call Dr. Joseph Barajas's , at [phone] or email Dr. Joseph Elliott's physician assistant, at [email or web address].            Wound care and dressings       You have a gauze and tape dressing over the incision that you should change daily for one week.  Once you are one week out from surgery you do not need to keep a dressing over the incision.  There is a thin mesh film adhered to your skin over the incision which should stay in  "place until your follow-up appointment with Dr. Ruiz.  If this comes off you should call Dr. Ruiz's office for further instruction.  You can get your incision wet in the shower but you should not submerge it.                  Follow-up Appointments     Follow-up and recommended labs and tests       Your follow-up appointment is schedule for 2:20pm on Wednesday 9/20 at the [place] office with Dr. Ruiz.  Please call [phone] if you need to reschedule this appointment.     If you have any questions prior to your follow-up appointment please call Dr. Joseph Barajas's , at [phone].  You may also email Lexi with any questions at [email or web address]                  Pending Results     Date and Time Order Name Status Description    9/6/2017 1346 Fluid Culture Aerobic Bacterial Preliminary     9/6/2017 1346 Anaerobic bacterial culture Preliminary             Statement of Approval     Ordered          09/08/17 1025  I have reviewed and agree with all the recommendations and orders detailed in this document.  EFFECTIVE NOW     Approved and electronically signed by:  Lexi Okeefe PA-C           09/08/17 0915  I have reviewed and agree with all the recommendations and orders detailed in this document.  EFFECTIVE NOW     Approved and electronically signed by:  Lexi Okeefe PA-C             Admission Information     Date & Time Provider Department Dept. Phone    9/6/2017 Selvin Witt MD [place] [address] [facility] [phone]      Your Vitals Were     Blood Pressure Pulse Temperature Respirations Height Weight    126/61 (BP Location: Right arm) 65 98.6  F (37  C) (Oral) 18 1.727 m (5' 8\") 83.5 kg (184 lb)    Pulse Oximetry BMI (Body Mass Index)                97% 27.98 kg/m2          MyChart Information     [facility] lets you send messages to your doctor, view your test results, renew your prescriptions, schedule appointments and more. To sign up, go " "to [email or web address] . Click on \"Log in\" on the left side of the screen, which will take you to the Welcome page. Then click on \"Sign up Now\" on the right side of the page.     You will be asked to enter the access code listed below, as well as some personal information. Please follow the directions to create your username and password.     Your access code is: [email or web address]  Expires: 2017  7:12 AM     Your access code will  in 90 days. If you need help or a new code, please call your [place] clinic or [phone].        Care EveryWhere ID     This is your Care EveryWhere ID. This could be used by other organizations to access your [place] medical records  [id number]        Equal Access to Services     ELAYNE VILLALOBOS : Lincoln Pearson, alvarez hurt, jake ordaz, cherri gillespie . So [facility] [phone].    ATENCIÓN: Si habla español, tiene a martinez disposición servicios gratuitos de asistencia lingüística. Adelia al [phone].    We comply with applicable federal civil rights laws and [place] laws. We do not discriminate on the basis of race, color, national origin, age, disability sex, sexual orientation or gender identity.               Review of your medicines      START taking        Dose / Directions    aspirin  MG EC tablet   Used for:  S/P revision of total hip        Dose:  325 mg   Take 1 tablet (325 mg) by mouth 2 times daily   Quantity:  60 tablet   Refills:  0       senna-docusate 8.6-50 MG per tablet   Commonly known as:  SENOKOT-S;PERICOLACE   Used for:  S/P revision of total hip        Dose:  1-2 tablet   Take 1-2 tablets by mouth 2 times daily as needed for constipation   Quantity:  60 tablet   Refills:  0         CONTINUE these medicines which may have CHANGED, or have new prescriptions. If we are uncertain of the size of tablets/capsules you have at home, strength may be listed as something that might have changed.     "    Dose / Directions    oxyCODONE 5 MG IR tablet   Commonly known as:  ROXICODONE   This may have changed:    - medication strength  - how much to take  - when to take this  - reasons to take this   Used for:  S/P revision of total hip        Dose:  5-10 mg   Take 1-2 tablets (5-10 mg) by mouth every 4 hours as needed for moderate to severe pain   Quantity:  50 tablet   Refills:  0         CONTINUE these medicines which have NOT CHANGED        Dose / Directions    ALPHA LIPOIC ACID PO        Dose:  1 tablet   Take 1 tablet by mouth every morning OTC- unknown strength   Refills:  0       COENZYME Q-10 PO        Dose:  100 mg   Take 100 mg by mouth every morning   Refills:  0       COLCRYS PO        Dose:  0.6 mg   Take 0.6 mg by mouth every 2 hours as needed for moderate pain   Refills:  0       MAGNESIUM OXIDE PO        Dose:  250 mg   Take 250 mg by mouth every morning   Refills:  0       multivitamin, therapeutic with minerals Tabs tablet        Dose:  1 tablet   Take 1 tablet by mouth every morning   Refills:  0       * NUTRITIONAL SUPPLEMENT PO        Dose:  1 tablet   Take 1 tablet by mouth 2 times daily (with meals) (breakfast and lunch) URIC ACID CONTROL   Refills:  0       * NUTRITIONAL SUPPLEMENT PO        Dose:  1 tablet   Take 1 tablet by mouth 3 times daily (with meals) GOUT CONTROL   Refills:  0       PROBIOTIC PO        Dose:  1 capsule   Take 1 capsule by mouth every morning   Refills:  0       TART CHERRY ADVANCED PO        Dose:  1 capsule   Take 1 capsule by mouth every morning   Refills:  0       TYLENOL PO        Dose:  1000 mg   Take 1,000 mg by mouth 3 times daily as needed for mild pain or fever   Refills:  0       VITAMIN C PO        Dose:  500 mg   Take 500 mg by mouth every morning   Refills:  0       VITAMIN D (CHOLECALCIFEROL) PO        Dose:  1 tablet   Take 1 tablet by mouth every morning   Refills:  0       * Notice:  This list has 2 medication(s) that are the same as other medications  prescribed for you. Read the directions carefully, and ask your doctor or other care provider to review them with you.         Where to get your medicines      These medications were sent to [facility] [place], [place] - [address]  [address], [place] [address]     Phone:  [phone]     aspirin  MG EC tablet    senna-docusate 8.6-50 MG per tablet         Some of these will need a paper prescription and others can be bought over the counter. Ask your nurse if you have questions.     Bring a paper prescription for each of these medications     oxyCODONE 5 MG IR tablet                Protect others around you: Learn how to safely use, store and throw away your medicines at www.disposemymeds.org.             Medication List: This is a list of all your medications and when to take them. Check marks below indicate your daily home schedule. Keep this list as a reference.      Medications           Morning Afternoon Evening Bedtime As Needed    ALPHA LIPOIC ACID PO   Take 1 tablet by mouth every morning OTC- unknown strength                                   aspirin  MG EC tablet   Take 1 tablet (325 mg) by mouth 2 times daily            9/10/2017                          COENZYME Q-10 PO   Take 100 mg by mouth every morning                                   COLCRYS PO   Take 0.6 mg by mouth every 2 hours as needed for moderate pain   Last time this was given:  0.6 mg on 9/7/2017  5:35 PM                                   MAGNESIUM OXIDE PO   Take 250 mg by mouth every morning                                   multivitamin, therapeutic with minerals Tabs tablet   Take 1 tablet by mouth every morning                                   * NUTRITIONAL SUPPLEMENT PO   Take 1 tablet by mouth 2 times daily (with meals) (breakfast and lunch) URIC ACID CONTROL                                   * NUTRITIONAL SUPPLEMENT PO   Take 1 tablet by mouth 3 times daily (with meals) GOUT CONTROL                                    oxyCODONE 5 MG IR tablet   Commonly known as:  ROXICODONE   Take 1-2 tablets (5-10 mg) by mouth every 4 hours as needed for moderate to severe pain   Last time this was given:  10 mg on 9/7/2017 12:14 AM                                   PROBIOTIC PO   Take 1 capsule by mouth every morning                                   senna-docusate 8.6-50 MG per tablet   Commonly known as:  SENOKOT-S;PERICOLACE   Take 1-2 tablets by mouth 2 times daily as needed for constipation   Last time this was given:  2 tablets on 9/8/2017  8:51 AM                        9/9/2017           TART CHERRY ADVANCED PO   Take 1 capsule by mouth every morning                                   TYLENOL PO   Take 1,000 mg by mouth 3 times daily as needed for mild pain or fever   Last time this was given:  975 mg on 9/9/2017 10:04 AM                    9/9/2017 6:00 pm               VITAMIN C PO   Take 500 mg by mouth every morning                                   VITAMIN D (CHOLECALCIFEROL) PO   Take 1 tablet by mouth every morning                                   * Notice:  This list has 2 medication(s) that are the same as other medications prescribed for you. Read the directions carefully, and ask your doctor or other care provider to review them with you.

## 2017-09-06 NOTE — ANESTHESIA POSTPROCEDURE EVALUATION
Patient: Nhan Marc    Procedure(s):  RIGHT TOTAL HIP REVISION - Wound Class: I-Clean    Diagnosis:djd  Diagnosis Additional Information: No value filed.    Anesthesia Type:  General, ETT    Note:  Anesthesia Post Evaluation    Patient location during evaluation: PACU  Patient participation: Able to fully participate in evaluation  Level of consciousness: awake  Pain management: adequate  Airway patency: patent  Cardiovascular status: acceptable  Respiratory status: acceptable  Hydration status: acceptable  PONV: controlled     Anesthetic complications: None          Last vitals:  Vitals:    09/06/17 1710 09/06/17 1714 09/06/17 1725   BP: 111/59  123/63   Resp: 12 17 15   Temp:  37.2  C (99  F) 36.4  C (97.5  F)   SpO2:  98% 96%         Electronically Signed By: Dione Navarro MD  September 6, 2017  6:45 PM

## 2017-09-06 NOTE — ANESTHESIA PREPROCEDURE EVALUATION
Anesthesia Evaluation     . Pt has had prior anesthetic.            ROS/MED HX    ENT/Pulmonary:     (+)allergic rhinitis, tobacco use, Past use , . .   (-) sleep apnea   Neurologic:      (-) CVA   Cardiovascular: Comment: BP labile        (-) BURNS   METS/Exercise Tolerance:  >4 METS   Hematologic:     (+) Anemia, -      Musculoskeletal: Comment: MARY infection, now revision   (+) arthritis, , , -       GI/Hepatic:        (-) GERD   Renal/Genitourinary:         Endo:         Psychiatric:         Infectious Disease:         Malignancy:         Other: Comment: gout                    Physical Exam      Airway   Mallampati: II  TM distance: >3 FB  Neck ROM: full    Dental   (+) caps    Cardiovascular   Rhythm and rate: regular      Pulmonary    breath sounds clear to auscultation                    Anesthesia Plan      History & Physical Review  History and physical reviewed and following examination; no interval change.    ASA Status:  2 .        Plan for General and ETT   PONV prophylaxis:  Ondansetron (or other 5HT-3)       Postoperative Care      Consents  Anesthetic plan, risks, benefits and alternatives discussed with:  Patient.  Use of blood products discussed: Yes.   Use of blood products discussed with Patient.  .                          .  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     Squamous cell cancer of external ear      Past Surgical History:   Procedure Laterality Date     ARTHROPLASTY HIP ANTERIOR Left 3/24/2016    Procedure: ARTHROPLASTY HIP ANTERIOR;  Surgeon: Selvin Witt MD;  Location: [facility]     ARTHROSCOPY KNEE       C TOTAL HIP ARTHROPLASTY  06-    right hip 2013,  left hip 2017     REMOVE HARDWARE ARTHROPLASTY HIP, I&D, PLACE ANTIBIOTIC CEMENT BEADS / Right 6/30/2017    Procedure: COMBINED REMOVE HARDWARE ARTHROPLASTY HIP, IRRIGATION AND DEBRIDEMENT, PLACE ANTIBIOTIC CEMENT BEADS / SPACER;  REMOVAL OF TOTAL HIP ARTHROPLASTY AND PLACEMENT OF ANTIBIOTIC SPACER;  Surgeon: Selvin Witt MD;  Location: [facility] OR     VASECTOMY  1979     Prior to Admission medications    Medication Sig Start Date End Date Taking? Authorizing Provider   Nutritional Supplements (NUTRITIONAL SUPPLEMENT PO) Take 1 tablet by mouth 3 times daily (with meals) GOUT CONTROL   Yes Reported, Patient   OXYCODONE HCL PO Take 5 mg by mouth daily as needed (Gout Pain)   Yes Reported, Patient   Acetaminophen (TYLENOL PO) Take 1,000 mg by mouth 3 times daily as needed for mild pain or fever   Yes Reported, Patient   Colchicine (COLCRYS PO) Take 0.6 mg by mouth every 2 hours as needed for moderate pain   Yes Reported, Patient   Nutritional Supplements (NUTRITIONAL SUPPLEMENT PO) Take 1 tablet by mouth 2 times daily (with meals) (breakfast and lunch) URIC ACID CONTROL   Yes Reported, Patient   Misc Natural Products (TART CHERRY ADVANCED PO) Take 1 capsule by mouth every morning   Yes Reported, Patient   VITAMIN D, CHOLECALCIFEROL, PO Take 1 tablet by mouth every morning   Yes Reported, Patient   MAGNESIUM OXIDE PO Take 250 mg by mouth every morning   Yes Reported, Patient   Ascorbic Acid (VITAMIN C PO) Take 500 mg by mouth every morning   Yes Reported, Patient   ALPHA LIPOIC ACID PO Take 1 tablet by mouth every morning OTC- unknown strength   Yes Reported, Patient   COENZYME Q-10 PO Take 100 mg by mouth every morning    Yes Reported, Patient   multivitamin, therapeutic with minerals (THERA-VIT-M) TABS Take 1 tablet by mouth every morning    Yes Reported, Patient   Probiotic Product (PROBIOTIC PO) Take 1 capsule by mouth every morning    Yes Reported, Patient     Current Facility-Administered Medications Ordered in  Epic   Medication Dose Route Frequency Last Rate Last Dose     chlorhexidine 4 % solution   Topical Once        Or     chlorhexidine 2 % pads   Topical Once        Or     antimicrobial soap   Topical Once         chlorhexidine 4 % solution   Topical Once        Or     chlorhexidine 2 % pads   Topical Once        Or     antimicrobial soap   Topical Once         chlorhexidine 2 % pads   Topical Once        Or     antimicrobial soap   Topical Once         ceFAZolin sodium-dextrose (ANCEF) infusion 2 g  2 g Intravenous Pre-Op/Pre-procedure x 1 dose         ceFAZolin (ANCEF) 1 g vial to attach to  ml bag for ADULT or 50 ml bag for PEDS  1 g Intravenous See Admin Instructions         tranexamic acid (CYKLOKAPRON) 1 g in NaCl 0.9 % 60 mL bolus  1 g Intravenous Once         acetaminophen (TYLENOL) tablet 1,000 mg  1,000 mg Oral Once         tranexamic acid (CYKLOKAPRON) 1 g in NaCl 0.9 % 60 mL bolus  1 g Intravenous Once         lidocaine 1 % 1 mL  1 mL Other Q1H PRN         lactated ringers infusion   Intravenous Continuous         No current [facility]-ordered outpatient prescriptions on file.     Wt Readings from Last 1 Encounters:   09/06/17 83.5 kg (184 lb)     Temp Readings from Last 1 Encounters:   09/06/17 36.7  C (98  F) (Temporal)     BP Readings from Last 6 Encounters:   09/06/17 146/89   07/03/17 149/83   06/01/17 154/74   03/25/16 (!) 170/99     Pulse Readings from Last 4 Encounters:   07/03/17 87   06/01/17 78   03/25/16 69     Resp Readings from Last 1 Encounters:   09/06/17 20     SpO2 Readings from Last 1 Encounters:   09/06/17 99%     Recent Labs   Lab Test  09/06/17   0935  07/03/17   0656  07/02/17   0623  07/01/17   0647  06/30/17   1621   03/25/16   0648   NA   --    --    --   137  139   --   140   POTASSIUM   --    --    --   4.2  4.1   --   3.7   CHLORIDE   --    --    --   105   --    --   107   CO2   --    --    --   24   --    --   28   ANIONGAP   --    --    --   8   --    --   5   GLC   --     --   105*  128*   --    --   108*  109*   BUN   --    --    --   17   --    --   13   CR  0.78  0.69   --   0.85   --    < >  0.83   IVANNA   --    --    --   7.6*   --    --   7.8*    < > = values in this interval not displayed.     No results for input(s): AST, ALT in the last 15408 hours.    Invalid input(s): ALP, BILT, LPSE  Recent Labs   Lab Test  09/06/17   0935  07/03/17   0656   07/02/17   0623   03/25/16   0648   WBC   --    --    --   9.3   --   10.2   HGB  12.9*  6.4*   < >  6.9*   < >  10.8*   PLT   --   231   --   221   < >  197    < > = values in this interval not displayed.     No results for input(s): INR in the last 57484 hours.    Invalid input(s): APTT   No results for input(s): TROPI in the last 74452 hours.  RECENT LABS:   ECG:   ECHO:   CXR:

## 2017-09-07 ENCOUNTER — APPOINTMENT (OUTPATIENT)
Dept: PHYSICAL THERAPY | Facility: CLINIC | Age: 72
DRG: 468 | End: 2017-09-07
Attending: ORTHOPAEDIC SURGERY
Payer: MEDICARE

## 2017-09-07 ENCOUNTER — APPOINTMENT (OUTPATIENT)
Dept: OCCUPATIONAL THERAPY | Facility: CLINIC | Age: 72
DRG: 468 | End: 2017-09-07
Attending: ORTHOPAEDIC SURGERY
Payer: MEDICARE

## 2017-09-07 LAB
CO2 BLDCOV-SCNC: 26 MMOL/L (ref 21–28)
COPATH REPORT: NORMAL
HCT VFR BLD CALC: 28 %PCV (ref 40–53)
HGB BLD CALC-MCNC: 9.5 G/DL (ref 13.3–17.7)
HGB BLD-MCNC: 8 G/DL (ref 13.3–17.7)
PCO2 BLDV: 41 MM HG (ref 40–50)
PH BLDV: 7.42 PH (ref 7.32–7.43)
PO2 BLDV: 51 MM HG (ref 25–47)
POTASSIUM BLD-SCNC: 4.5 MMOL/L (ref 3.4–5.3)
SAO2 % BLDV FROM PO2: 86 %
SODIUM BLD-SCNC: 139 MMOL/L (ref 133–144)

## 2017-09-07 PROCEDURE — L2624 HIP ADJ FLEX EXT ABDUCT CONT: HCPCS

## 2017-09-07 PROCEDURE — 40000193 ZZH STATISTIC PT WARD VISIT

## 2017-09-07 PROCEDURE — 25000132 ZZH RX MED GY IP 250 OP 250 PS 637: Mod: GY | Performed by: HOSPITALIST

## 2017-09-07 PROCEDURE — A9270 NON-COVERED ITEM OR SERVICE: HCPCS | Mod: GY | Performed by: HOSPITALIST

## 2017-09-07 PROCEDURE — 12000007 ZZH R&B INTERMEDIATE

## 2017-09-07 PROCEDURE — 40000133 ZZH STATISTIC OT WARD VISIT

## 2017-09-07 PROCEDURE — L2830 SOFT INTERFACE ABOVE KNEE SE: HCPCS

## 2017-09-07 PROCEDURE — 85018 HEMOGLOBIN: CPT | Performed by: ORTHOPAEDIC SURGERY

## 2017-09-07 PROCEDURE — 97535 SELF CARE MNGMENT TRAINING: CPT | Mod: GO

## 2017-09-07 PROCEDURE — 97116 GAIT TRAINING THERAPY: CPT | Mod: GP

## 2017-09-07 PROCEDURE — 97110 THERAPEUTIC EXERCISES: CPT | Mod: GP

## 2017-09-07 PROCEDURE — 97165 OT EVAL LOW COMPLEX 30 MIN: CPT | Mod: GO

## 2017-09-07 PROCEDURE — 36415 COLL VENOUS BLD VENIPUNCTURE: CPT | Performed by: ORTHOPAEDIC SURGERY

## 2017-09-07 PROCEDURE — A9270 NON-COVERED ITEM OR SERVICE: HCPCS | Mod: GY | Performed by: ORTHOPAEDIC SURGERY

## 2017-09-07 PROCEDURE — 25000128 H RX IP 250 OP 636: Performed by: ORTHOPAEDIC SURGERY

## 2017-09-07 PROCEDURE — 25000132 ZZH RX MED GY IP 250 OP 250 PS 637: Mod: GY | Performed by: ORTHOPAEDIC SURGERY

## 2017-09-07 PROCEDURE — L1686 HO POST-OP HIP ABDUCTION: HCPCS

## 2017-09-07 PROCEDURE — 97161 PT EVAL LOW COMPLEX 20 MIN: CPT | Mod: GP

## 2017-09-07 RX ORDER — COLCHICINE 0.6 MG/1
0.6 TABLET ORAL
Status: DISCONTINUED | OUTPATIENT
Start: 2017-09-07 | End: 2017-09-09 | Stop reason: HOSPADM

## 2017-09-07 RX ORDER — OXYCODONE HYDROCHLORIDE 5 MG/1
5-10 TABLET ORAL EVERY 4 HOURS PRN
Qty: 50 TABLET | Refills: 0 | Status: SHIPPED | OUTPATIENT
Start: 2017-09-07

## 2017-09-07 RX ADMIN — ACETAMINOPHEN 975 MG: 325 TABLET, FILM COATED ORAL at 00:14

## 2017-09-07 RX ADMIN — SENNOSIDES AND DOCUSATE SODIUM 1 TABLET: 8.6; 5 TABLET ORAL at 09:14

## 2017-09-07 RX ADMIN — ENOXAPARIN SODIUM 40 MG: 40 INJECTION SUBCUTANEOUS at 09:14

## 2017-09-07 RX ADMIN — COLCHICINE 0.6 MG: 0.6 TABLET, FILM COATED ORAL at 17:35

## 2017-09-07 RX ADMIN — COLCHICINE 0.6 MG: 0.6 TABLET, FILM COATED ORAL at 12:59

## 2017-09-07 RX ADMIN — CEFAZOLIN SODIUM 2 G: 2 INJECTION, SOLUTION INTRAVENOUS at 02:49

## 2017-09-07 RX ADMIN — ACETAMINOPHEN 975 MG: 325 TABLET, FILM COATED ORAL at 17:35

## 2017-09-07 RX ADMIN — COLCHICINE 0.6 MG: 0.6 TABLET, FILM COATED ORAL at 09:59

## 2017-09-07 RX ADMIN — ACETAMINOPHEN 975 MG: 325 TABLET, FILM COATED ORAL at 09:14

## 2017-09-07 RX ADMIN — OXYCODONE HYDROCHLORIDE 10 MG: 5 TABLET ORAL at 00:14

## 2017-09-07 NOTE — PLAN OF CARE
Problem: Goal Outcome Summary  Goal: Goal Outcome Summary  Outcome: Improving  Vitals stable, CMS intact.  No pain indicated.  Pt up and assist with 1.  Dressing changed, clean, dry and intact.  Gordon cath discontinued due to void.  Right hand PIV discontinued due to gout in hand.  Left antecubital  PIV saline locked.  Hgb low 8.0mg/dL. Asymptomatic.

## 2017-09-07 NOTE — CONSULTS
PRIMARY CARE PHYSICIAN:  Kole Parker MD      REQUESTING PHYSICIAN:  Selvin Ruiz MD from Orthopedics      REASON FOR CONSULTATION:  Med management, gout.      HISTORY OF PRESENT ILLNESS:  Mr. Nhan Marc is a pleasant 72-year-old male with past medical history significant for gout, MARY with infection who is admitted to Orthopedics for right hip revision and hospitalist was requested consultation for gout and med management.  He had right total hip arthroplasty about 3 years ago and then was admitted in 06/2017 for infected hip and had removal of failed hardware with placement of antibiotic spacer and today he had a right hip revision.  He does have history of gout and has occasional flares.  Today he states his right hand at the middle MCP joint is swollen and tender reminiscent of his gout attack.  He takes colchicine p.r.n. and has been intolerant to ibuprofen in the past and takes oxycodone p.r.n.  He denies any fever, chills or rigors.  No chest pain or shortness of breath.  Denies pain in abdomen.  Reports a normal bowel and bladder habit.      REVIEW OF SYSTEMS:  A 10-point review of systems was done and was negative apart from those mentioned in the history of present illness.      PAST MEDICAL HISTORY:   1.  Right total hip arthroplasty with infected hardware, now status post right hip revision.   2.  Gout.      MEDICATIONS PRIOR TO ADMISSION:  Prior to Admission medications    Medication Sig Last Dose Taking? Auth Provider   senna-docusate (SENOKOT-S;PERICOLACE) 8.6-50 MG per tablet Take 1-2 tablets by mouth 2 times daily as needed for constipation  Yes Lexi Okeefe PA-C   aspirin  MG EC tablet Take 1 tablet (325 mg) by mouth 2 times daily  Yes Lexi Okeefe PA-C   oxyCODONE (ROXICODONE) 5 MG IR tablet Take 1-2 tablets (5-10 mg) by mouth every 4 hours as needed for moderate to severe pain  Yes Lexi kOeefe PA-C   Nutritional Supplements  (NUTRITIONAL SUPPLEMENT PO) Take 1 tablet by mouth 3 times daily (with meals) GOUT CONTROL 9/5/2017 at 1200 Yes Reported, Patient   Acetaminophen (TYLENOL PO) Take 1,000 mg by mouth 3 times daily as needed for mild pain or fever 9/6/2017 at 0100 Yes Reported, Patient   Colchicine (COLCRYS PO) Take 0.6 mg by mouth every 2 hours as needed for moderate pain July 2017 at PRN Yes Reported, Patient   Nutritional Supplements (NUTRITIONAL SUPPLEMENT PO) Take 1 tablet by mouth 2 times daily (with meals) (breakfast and lunch) URIC ACID CONTROL 8/30/2017 at Noon Yes Reported, Patient   Misc Natural Products (TART CHERRY ADVANCED PO) Take 1 capsule by mouth every morning 8/30/2017 at AM Yes Reported, Patient   VITAMIN D, CHOLECALCIFEROL, PO Take 1 tablet by mouth every morning 8/30/2017 at AM Yes Reported, Patient   MAGNESIUM OXIDE PO Take 250 mg by mouth every morning 8/30/2017 at AM Yes Reported, Patient   Ascorbic Acid (VITAMIN C PO) Take 500 mg by mouth every morning 8/30/2017 at AM Yes Reported, Patient   ALPHA LIPOIC ACID PO Take 1 tablet by mouth every morning OTC- unknown strength 8/30/2017 at AM Yes Reported, Patient   COENZYME Q-10 PO Take 100 mg by mouth every morning  8/23/2017 at AM Yes Reported, Patient   multivitamin, therapeutic with minerals (THERA-VIT-M) TABS Take 1 tablet by mouth every morning  8/30/2017 at AM Yes Reported, Patient   Probiotic Product (PROBIOTIC PO) Take 1 capsule by mouth every morning  8/30/2017 at AM Yes Reported, Patient           ALLERGIES:  No known drug allergies.      SOCIAL HISTORY:  He denies smoking, alcohol or illicit drug use.      FAMILY HISTORY:  Reviewed and not pertinent to current presentation.      PHYSICAL EXAMINATION:   GENERAL:  The patient is conscious, alert, oriented x3, lying comfortably in bed in no apparent distress.   VITAL SIGNS:  Temperature 97.5, heart rate of 75, blood pressure 123/63, saturation 96% on room air.   HEENT:  Pupils are equal and reactive to  light and accommodation.  Extraocular movements are intact.  Oral mucosa moist.   NECK:  Supple.  No JVD.   RESPIRATORY:  Lungs sounds bilaterally clear to auscultation, no wheezes or crepitation.   CARDIOVASCULAR:  Normal S1, S2, regular rate and rhythm, no murmur.   ABDOMEN:  Soft, nontender, nondistended, no guarding, rigidity or rebound tenderness.   LOWER EXTREMITIES:  No edema.  He is on hip immobilizer, status post right total hip arthroplasty.  On the right hand exam, his MCP joint at the middle finger is swollen, red and slightly tender with local warmth.   NEUROLOGIC:  Normal mood and affect.      LABORATORY DATA:  Creatinine 0.78, hemoglobin of 12.9.      ASSESSMENT AND PLAN:  Mr. Sandy Montano is a 72-year-old male with a past medical history significant for right MARY, gout who underwent right hip revision today and hospitalist consulted for gout management.     1.  Infected right total hip arthroplasty, status post right hip revision, postop day #0.  Postop care including pain control and deep venous thrombosis prophylaxis will be per Orthopedics.   2.  Acute gout flare.  Will give colchicine 1.2 mg now and then order 0.6 mg p.r.n. for further flares.  Will also continue with his p.r.n. pain medications.   3.  Deep venous prophylaxis.  He has been started on Lovenox by Orthopedics.      CODE STATUS:  Full code.      We thank [facility]opedic [facility] for letting us participate in the care of this patient.  The patient has no other active medical issues at this time and hospitalist will sign off.         MARJORIE WASHBURN MD             D: 2017 18:37   T: 2017 19:01   MT: mg      Name:     SANDY MONTANO   MRN:      [id number]        Account:       [id number]   :      1945           Consult Date:  2017      Document: [id number]       cc: Kole Witt MD

## 2017-09-07 NOTE — PROGRESS NOTES
09/07/17 1251   Quick Adds   Type of Visit Initial Occupational Therapy Evaluation   Living Environment   Lives With spouse   Living Arrangements house   Home Accessibility stairs to enter home   Number of Stairs to Enter Home 2   Number of Stairs Within Home 0   Stair Railings at Home none   Transportation Available car;family or friend will provide   Self-Care   Dominant Hand right   Usual Activity Tolerance good   Current Activity Tolerance moderate   Regular Exercise yes   Activity/Exercise Type strength training   Exercise Amount/Frequency daily   Equipment Currently Used at Home grab bar;raised toilet;walker, rolling  (reacher, sock-aid, long shoe horn)   Functional Level Prior   Ambulation 1-->assistive equipment   Transferring 0-->independent   Toileting 1-->assistive equipment   Bathing 1-->assistive equipment   Dressing 0-->independent   Eating 0-->independent   Communication 0-->understands/communicates without difficulty   Swallowing 0-->swallows foods/liquids without difficulty   Cognition 0 - no cognition issues reported   Fall history within last six months no   Which of the above functional risks had a recent onset or change? ambulation;transferring   General Information   Onset of Illness/Injury or Date of Surgery - Date 09/06/17   Referring Physician Dr. Selvin Ruiz   Patient/Family Goals Statement return home w/sposue assist   Additional Occupational Profile Info/Pertinent History of Current Problem R TH revision on 9/6/17; R MARY 3yrs ago and admitted June 2017 for infected hip and had removal of failed hardware with placement of antibiotic spacer.    Precautions/Limitations fall precautions;right hip precautions   Weight-Bearing Status - RLE weight-bearing as tolerated   General Info Comments Pt to have R hip brace fabricated for use prior to dc   Cognitive Status Examination   Cognitive Comment globally intact   Pain Assessment   Patient Currently in Pain No   Range of Motion (ROM)   ROM  "Quick Adds No deficits were identified   Strength   Manual Muscle Testing Quick Adds No deficits were identified   Transfer Skills   Transfer Comments SBA with functional transfers, bed mobility.   General Therapy Interventions   Planned Therapy Interventions ADL retraining   Intervention Comments Patient and spouse decline training in use of AE as pt familiar with use, owns all as needed, and spouse provides assist with LE dressing and other tasks as needed as well.    Clinical Impression   Criteria for Skilled Therapeutic Interventions Met yes, treatment indicated   OT Diagnosis decreased functional mobility   Influenced by the following impairments post-op total hip precautions, strength   Assessment of Occupational Performance 1-3 Performance Deficits   Identified Performance Deficits functional mobility   Clinical Decision Making (Complexity) Low complexity   Predicted Duration of Therapy Intervention (days/wks) Eval and one treatment session only   Anticipated Discharge Disposition Home with Assist   Risks and Benefits of Treatment have been explained. Yes   Patient, Family & other staff in agreement with plan of care Yes   [facility] TM \"6 Clicks\"   2016, Trustees of [facility], under license to [employer].  All rights reserved.   6 Clicks Short Forms Daily Activity Inpatient Short Form   [facility]  \"6 Clicks\" Daily Activity Inpatient Short Form   1. Putting on and taking off regular lower body clothing? 2 - A Lot   2. Bathing (including washing, rinsing, drying)? 2 - A Lot   3. Toileting, which includes using toilet, bedpan or urinal? 4 - None   4. Putting on and taking off regular upper body clothing? 4 - None   5. Taking care of personal grooming such as brushing teeth? 4 - None   6. Eating meals? 4 - None   Daily Activity Raw Score (Score out of 24.Lower scores equate to lower levels of function) 20   Total Evaluation Time   Total Evaluation Time (Minutes) 15     "

## 2017-09-07 NOTE — PLAN OF CARE
Problem: Goal Outcome Summary  Goal: Goal Outcome Summary  Outcome: No Change  A&O, VSS. CMS intact. Gout flare up non painful, 3+ swelling of left hand. Dressing CDI. Pain managed with Oxycodone and scheduled Toradol. Stood at bedside. Gordon patent. Tolerating full liquids. D/C pending

## 2017-09-07 NOTE — PLAN OF CARE
Problem: Goal Outcome Summary  Goal: Goal Outcome Summary  Initial evaluation completed and treatment initiated.  Pt had right total hip arthroplasty about 3 years ago and then was admitted in 06/2017 for infected hip and had removal of failed hardware with placement of antibiotic spacer and underwent a right total hip revision on 9/6/17.  Pt lives with his wife who is able to assist and prior to admission he was Kirby with mobility with FWW and Kirby/IND with ADL's.       Discharge Planner PT   Patient plan for discharge:  None stated.    Current status:  SBA with bed mobility, CGA and FWW with functional transfers, ambulated 260' with CGA and FWW.    Barriers to return to prior living situation:  Stairs without handrails to enter home, generalized weakness, fatigue.   Recommendations for discharge:  TBD POD #2  Rationale for recommendations:  Will continue to assess and make discharge recommendations POD #2       Entered by: Cori Jiménez 09/07/2017 8:57 AM

## 2017-09-07 NOTE — PROGRESS NOTES
" 09/07/17 0800   Quick Adds   Type of Visit Initial PT Evaluation   Living Environment   Lives With spouse   Living Arrangements house   Home Accessibility stairs to enter home   Number of Stairs to Enter Home 2   Number of Stairs Within Home 0  (stairs to basement but all needs can be met on main level )   Stair Railings at Home none   Transportation Available car;family or friend will provide  (pt drives at baseline, wife also drives )   Living Environment Comment pt lives with his wife who is able to assist.    Self-Care   Dominant Hand right   Usual Activity Tolerance good   Current Activity Tolerance fair   Regular Exercise yes   Activity/Exercise Type strength training   Exercise Amount/Frequency daily  (2x/day )   Equipment Currently Used at Home grab bar;raised toilet;walker, rolling   Functional Level Prior   Ambulation 1-->assistive equipment   Transferring 0-->independent   Toileting 1-->assistive equipment   Bathing 1-->assistive equipment   Dressing 0-->independent   Eating 0-->independent   Communication 0-->understands/communicates without difficulty   Swallowing 0-->swallows foods/liquids without difficulty   Cognition 0 - no cognition issues reported   Fall history within last six months no   Which of the above functional risks had a recent onset or change? ambulation;transferring   General Information   Onset of Illness/Injury or Date of Surgery - Date 09/06/17   Referring Physician Selvin Witt MD   Patient/Family Goals Statement \"I want to get stronger and get my muscle tone back.\"   Pertinent History of Current Problem (include personal factors and/or comorbidities that impact the POC) Pt had right total hip arthroplasty about 3 years ago and then was admitted in 06/2017 for infected hip and had removal of failed hardware with placement of antibiotic spacer and underwent a right total hip revision on 9/6/17.    Precautions/Limitations fall precautions;right hip precautions  (Ozzy-Lateral " "OR Direct Lateral Exposure )   Weight-Bearing Status - RLE weight-bearing as tolerated   General Observations pt supine in bed upon arrival and agreeable to PT session.    General Info Comments Activity: Ambulate with assist 3 TIMES DAILY     Cognitive Status Examination   Orientation orientation to person, place and time   Level of Consciousness alert   Follows Commands and Answers Questions 100% of the time   Personal Safety and Judgment intact   Pain Assessment   Patient Currently in Pain No   Strength   Strength Comments functional weakness demonstrated with transfers and gait.    Bed Mobility   Bed Mobility Comments SBA   Transfer Skills   Transfer Comments CGA and FWW   Gait   Gait Comments CGA and FWW   Balance   Balance no deficits were identified   General Therapy Interventions   Planned Therapy Interventions bed mobility training;gait training;strengthening;transfer training;home program guidelines;progressive activity/exercise   Clinical Impression   Criteria for Skilled Therapeutic Intervention yes, treatment indicated   PT Diagnosis difficulty with transfers and gait.   Influenced by the following impairments s/p right total hip revision on 9/6/17.    Functional limitations due to impairments impaired functional mobility and impaired functional activity tolerance.   Clinical Presentation Stable/Uncomplicated   Clinical Presentation Rationale pt's condition is stable.   Clinical Decision Making (Complexity) Low complexity   Therapy Frequency` 2 times/day   Predicted Duration of Therapy Intervention (days/wks) 3 days   Anticipated Equipment Needs at Discharge other (see comments)  (none anticipated )   Anticipated Discharge Disposition Other (see comments)  (TBD POD #2)   Risk & Benefits of therapy have been explained Yes   Patient, Family & other staff in agreement with plan of care Yes   [facility] AM-PAC TM \"6 Clicks\"   2016, Trustees of [facility], under license to [employer].  All " "rights reserved.   6 Clicks Short Forms Basic Mobility Inpatient Short Form   [facility] AM-PAC  \"6 Clicks\" V.2 Basic Mobility Inpatient Short Form   1. Turning from your back to your side while in a flat bed without using bedrails? 3 - A Little   2. Moving from lying on your back to sitting on the side of a flat bed without using bedrails? 3 - A Little   3. Moving to and from a bed to a chair (including a wheelchair)? 3 - A Little   4. Standing up from a chair using your arms (e.g., wheelchair, or bedside chair)? 3 - A Little   5. To walk in hospital room? 3 - A Little   6. Climbing 3-5 steps with a railing? 2 - A Lot   Basic Mobility Raw Score (Score out of 24.Lower scores equate to lower levels of function) 17   Total Evaluation Time   Total Evaluation Time (Minutes) 10     "

## 2017-09-07 NOTE — PLAN OF CARE
Problem: Goal Outcome Summary  Goal: Goal Outcome Summary  OT eval and treatment completed in AM.   Discharge Planner OT   Patient plan for discharge: home w/spouse assist  Current status: SBA with bed mob, functional transfers, declined training in modified ADLs as familiar with modified strategies, owns equipment from previous surgeries and spouse provides assist as needed. Spouse expressed concerns with patient maintaining post-op hip precautions and had many questions regarding best methods for daily tasks and OT provided education (verbal and visual demonstration, handouts) to both her and pt's satisfaction. Both verbalized understanding of learned info.  No further OT required at this time and discharged. PT to continue to follow for mobility training.   Barriers to return to prior living situation: none noted  Recommendations for discharge: see PT dc recommendation, no concerns from OT standpoint with dc home  Rationale for recommendations: see PT recommendations as they continue to see for mobility training       Entered by: Sathish Galeano 09/07/2017 1:19 PM       Comments:   Occupational Therapy Discharge Summary     Reason for therapy discharge:    All goals and outcomes met, no further needs identified.     Progress towards therapy goal(s). See goals on Care Plan in [facility] electronic health record for goal details.  Goals met     Therapy recommendation(s):    No further therapy is recommended.

## 2017-09-07 NOTE — PLAN OF CARE
Problem: Goal Outcome Summary  Goal: Goal Outcome Summary  Outcome: No Change  A&O, VSS. CMS intact. Gout flare up non painful, 3+ swelling of left hand. Dressing CDI. Pain managed with Oxycodone, refused scheduled toradol. Gordon patent. Tolerating full liquids. D/C pending

## 2017-09-07 NOTE — PROGRESS NOTES
S: Pt was seen today at 5504-02 for fitting of a RIGHT hip abduction brace as ordered.  Pt wife is very concerned and is having a hard time understanding the purpose of the brace even after I have explained several times.  Pt wife in concerned and does not see how the brace is going to work correctly with her husbands elisabeth PHAN as she has described.  Pt wife has requested I come by in the morning to view the brace while he is standing with it.  I will stop by between 8-9am.    O/G: Reduce motion and help hold hip in abduction..    A: At this time, I have fit patient with an off-the-shelf hip abduction brace.  A size large hip section was used and size large thigh section was used.  The hip joint was set at 0-90 degrees flex/ext and 10 degrees abduction as requested.  Patient and wife were instructed with proper donning/doffing, use and cleaning.  I feel that the brace is fitting pt appropriately at this time.      P: Patient and wife have been instructed to contact our facility with any future questions and/or concerns.      Off-the-shelf Hip Brace Instructions

## 2017-09-07 NOTE — PROGRESS NOTES
"Orthopedic Surgery  9/7/2017  POD #1    S: Patient voices no complaints today.     O: Blood pressure 105/55, temperature 98.3  F (36.8  C), temperature source Oral, resp. rate 16, height 1.727 m (5' 8\"), weight 83.5 kg (184 lb), SpO2 99 %.  Lab Results   Component Value Date    HGB 8.0 09/07/2017     Neurovascularly intact.  Calves are negative bilaterally, both soft and nontender.  The dressing is C/D/I.  The wound looks good with minimal erythema of the surrounding skin.    A: Mr. Marc is doing well status post revision right total hip arthroplasty.    P:   1. Dressing change tomorrow  2. Mobilize and continue physical therapy.   3. Plan fitting for hip abduction brace today.  4. Anticipate discharge to home tomorrow.    Lexi Okeefe PA-C  [phone]  "

## 2017-09-08 ENCOUNTER — APPOINTMENT (OUTPATIENT)
Dept: PHYSICAL THERAPY | Facility: CLINIC | Age: 72
DRG: 468 | End: 2017-09-08
Attending: ORTHOPAEDIC SURGERY
Payer: MEDICARE

## 2017-09-08 LAB
HGB BLD-MCNC: 7.8 G/DL (ref 13.3–17.7)
HGB BLD-MCNC: 8.4 G/DL (ref 13.3–17.7)

## 2017-09-08 PROCEDURE — A9270 NON-COVERED ITEM OR SERVICE: HCPCS | Mod: GY | Performed by: ORTHOPAEDIC SURGERY

## 2017-09-08 PROCEDURE — 40000193 ZZH STATISTIC PT WARD VISIT: Performed by: PHYSICAL THERAPY ASSISTANT

## 2017-09-08 PROCEDURE — 97530 THERAPEUTIC ACTIVITIES: CPT | Mod: GP | Performed by: PHYSICAL THERAPY ASSISTANT

## 2017-09-08 PROCEDURE — 85018 HEMOGLOBIN: CPT | Performed by: ORTHOPAEDIC SURGERY

## 2017-09-08 PROCEDURE — 97116 GAIT TRAINING THERAPY: CPT | Mod: GP | Performed by: PHYSICAL THERAPY ASSISTANT

## 2017-09-08 PROCEDURE — 36415 COLL VENOUS BLD VENIPUNCTURE: CPT | Performed by: ORTHOPAEDIC SURGERY

## 2017-09-08 PROCEDURE — 25000128 H RX IP 250 OP 636: Performed by: ORTHOPAEDIC SURGERY

## 2017-09-08 PROCEDURE — 12000007 ZZH R&B INTERMEDIATE

## 2017-09-08 PROCEDURE — 25000132 ZZH RX MED GY IP 250 OP 250 PS 637: Mod: GY | Performed by: ORTHOPAEDIC SURGERY

## 2017-09-08 PROCEDURE — 97110 THERAPEUTIC EXERCISES: CPT | Mod: GP | Performed by: PHYSICAL THERAPY ASSISTANT

## 2017-09-08 RX ORDER — AMOXICILLIN 250 MG
1-2 CAPSULE ORAL 2 TIMES DAILY PRN
Qty: 60 TABLET | Refills: 0 | Status: SHIPPED | OUTPATIENT
Start: 2017-09-08

## 2017-09-08 RX ADMIN — ACETAMINOPHEN 975 MG: 325 TABLET, FILM COATED ORAL at 17:29

## 2017-09-08 RX ADMIN — ACETAMINOPHEN 975 MG: 325 TABLET, FILM COATED ORAL at 08:52

## 2017-09-08 RX ADMIN — ENOXAPARIN SODIUM 40 MG: 40 INJECTION SUBCUTANEOUS at 10:45

## 2017-09-08 RX ADMIN — SENNOSIDES AND DOCUSATE SODIUM 2 TABLET: 8.6; 5 TABLET ORAL at 08:51

## 2017-09-08 NOTE — PROGRESS NOTES
"Orthopedic Surgery  9/8/2017  POD #2    S: Patient voices no complaints today.     O: Blood pressure 127/64, temperature 98.4  F (36.9  C), temperature source Oral, resp. rate 16, height 1.727 m (5' 8\"), weight 83.5 kg (184 lb), SpO2 96 %.  Lab Results   Component Value Date    HGB 7.8 09/08/2017     Neurovascularly intact.  Calves are negative bilaterally, both soft and nontender.  The dressing is C/D/I.    A: Mr. Marc is doing well status post revision right total hip arthroplasty.    P:   1. Fitted for brace and wearing now in bed.  Instructed he would wear this with activity but not at night or to shower.  2. Mobilize and continue physical therapy.   3. Hgb 7.8 noted, pt is asymptomatic.  4. Anticipate discharge to home tomorrow.    Lexi Okeefe PA-C  [phone]  "

## 2017-09-08 NOTE — PLAN OF CARE
Problem: Goal Outcome Summary  Goal: Goal Outcome Summary  Outcome: Improving  A/o4. Upx1 GB/walker. POx2. VSS. Denies pain. Incision CDI. Diet: Reg. Voiding. Gout flare much better, no pain. L PIV infiltrated - D/c'd. Plan for d/c 9/8.

## 2017-09-08 NOTE — PLAN OF CARE
Problem: Goal Outcome Summary  Goal: Goal Outcome Summary  Discharge Planner PT   Patient plan for discharge: Home with assist  Current status: Pt performed bed mobility with very minimal assist and sit to/from stand transfers with SBA.  Pt performed gait training 340 ft using wheeled walker and SBA.  Amb at fairly quick pace.  Cues for safety with turns.  Pt performed 3 stairs x 1 trial using bilateral rails and SBA.    Barriers to return to prior living situation: None       Entered by: Marialuisa Thurston 09/08/2017 12:23 PM

## 2017-09-08 NOTE — PLAN OF CARE
Problem: Goal Outcome Summary  Goal: Goal Outcome Summary  Outcome: Improving  Pt A&Ox4, VSS, CMS intact, pt up with assist of one and walker with abductor brace, voiding well per urinal, tolerating regular diet, scheduled tylenol for pain, IV SL, dressing C,D,I.

## 2017-09-08 NOTE — PLAN OF CARE
Problem: Goal Outcome Summary  Goal: Goal Outcome Summary  Outcome: Improving  VSS.  CMS intact.  Up with one.  Voiding.  Abductor brace in place. Continue with POC.

## 2017-09-08 NOTE — PLAN OF CARE
Problem: Goal Outcome Summary  Goal: Goal Outcome Summary  Outcome: Improving  Pt A & O, VSS on RA. Abductor brace in place, up with 1 & walker. Denies pain,voiding adequately. Plan is to discharge tomorrow. Progressing per plan of care.

## 2017-09-09 ENCOUNTER — APPOINTMENT (OUTPATIENT)
Dept: PHYSICAL THERAPY | Facility: CLINIC | Age: 72
DRG: 468 | End: 2017-09-09
Attending: ORTHOPAEDIC SURGERY
Payer: MEDICARE

## 2017-09-09 VITALS
HEIGHT: 68 IN | WEIGHT: 184 LBS | TEMPERATURE: 98.6 F | OXYGEN SATURATION: 97 % | SYSTOLIC BLOOD PRESSURE: 126 MMHG | HEART RATE: 65 BPM | BODY MASS INDEX: 27.89 KG/M2 | DIASTOLIC BLOOD PRESSURE: 61 MMHG | RESPIRATION RATE: 18 BRPM

## 2017-09-09 LAB
CREAT SERPL-MCNC: 0.68 MG/DL (ref 0.66–1.25)
GFR SERPL CREATININE-BSD FRML MDRD: >90 ML/MIN/1.7M2
PLATELET # BLD AUTO: 250 10E9/L (ref 150–450)

## 2017-09-09 PROCEDURE — 97116 GAIT TRAINING THERAPY: CPT | Mod: GP

## 2017-09-09 PROCEDURE — A9270 NON-COVERED ITEM OR SERVICE: HCPCS | Mod: GY | Performed by: ORTHOPAEDIC SURGERY

## 2017-09-09 PROCEDURE — 36415 COLL VENOUS BLD VENIPUNCTURE: CPT | Performed by: ORTHOPAEDIC SURGERY

## 2017-09-09 PROCEDURE — 25000132 ZZH RX MED GY IP 250 OP 250 PS 637: Mod: GY | Performed by: ORTHOPAEDIC SURGERY

## 2017-09-09 PROCEDURE — 40000193 ZZH STATISTIC PT WARD VISIT

## 2017-09-09 PROCEDURE — 85049 AUTOMATED PLATELET COUNT: CPT | Performed by: ORTHOPAEDIC SURGERY

## 2017-09-09 PROCEDURE — 82565 ASSAY OF CREATININE: CPT | Performed by: ORTHOPAEDIC SURGERY

## 2017-09-09 PROCEDURE — 97110 THERAPEUTIC EXERCISES: CPT | Mod: GP

## 2017-09-09 PROCEDURE — 25000128 H RX IP 250 OP 636: Performed by: ORTHOPAEDIC SURGERY

## 2017-09-09 RX ADMIN — ACETAMINOPHEN 975 MG: 325 TABLET, FILM COATED ORAL at 10:04

## 2017-09-09 RX ADMIN — ACETAMINOPHEN 975 MG: 325 TABLET, FILM COATED ORAL at 00:33

## 2017-09-09 RX ADMIN — ENOXAPARIN SODIUM 40 MG: 40 INJECTION SUBCUTANEOUS at 10:04

## 2017-09-09 NOTE — PLAN OF CARE
Problem: Goal Outcome Summary  Goal: Goal Outcome Summary  Outcome: Improving  A&O, Assist X1, denies pain. Voiding adequately. Brace on when out of the bed.Pt progressing per plan of care. D/c today.

## 2017-09-09 NOTE — PLAN OF CARE
Problem: Goal Outcome Summary  Goal: Goal Outcome Summary  PT:  Discharge Planner PT   Patient plan for discharge: Return home today  Current status: Pt required SBA for bed mobility, transfers, SBA for gait of 100+500 ft with FWW with steady balance. Able to justin abduction brace with min A and cues after education. Participated well in LE strengthening and advanced to more reps today.  Barriers to return to prior living situation: None  Recommendations for discharge: Return home with family assist as needed  Rationale for recommendations: Pt is safe with mobility, able to justin brace with min assist, has needed assistance at home.       Entered by: Teresita Greene 09/09/2017 9:32 AM     PT goals met.     Physical Therapy Discharge Summary     Reason for therapy discharge:    Discharged to home.     Progress towards therapy goal(s). See goals on Care Plan in [facility] electronic health record for goal details.  Goals met     Therapy recommendation(s):    Continue home exercise program.

## 2017-09-09 NOTE — PROGRESS NOTES
VSS, ABD brace on when ambulating. Denies pain, voiding adequately, tolerating reg diet. New dressing to be applied. All prescriptions and instructions given.

## 2017-09-11 LAB
BACTERIA SPEC CULT: NO GROWTH
Lab: NORMAL
SPECIMEN SOURCE: NORMAL

## 2017-09-12 NOTE — DISCHARGE SUMMARY
"Discharge Summary    hNan Marc MRN# [id number]   YOB: 1945 Age: 72 year old     Date of Admission: 9/6/2017    Date of Discharge: 9/9/2017    Reason for Admission: Nhan Marc is an 72 year old male who was admitted to the hospital following surgery.    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 Marc was admitted and underwent the above procedure. The patient tolerated the procedure well. There were no complications. Following surgery he was admitted to the floor.  During his stay he did not require any blood transfusions. His pain was controlled with oral pain medication.  During his stay he progressed well in physical therapy and all the therapy goals were met. He was also fitted for a hip abduction brace.  His final hemoglobin was 8.4.    Discharge Physical Exam:  /61 (BP Location: Right arm)  Pulse 65  Temp 98.6  F (37  C) (Oral)  Resp 18  Ht 1.727 m (5' 8\")  Wt 83.5 kg (184 lb)  SpO2 97%  BMI 27.98 kg/m2  Neurovascularly intact, distal pulses present bilaterally.  Calves are negative bilaterally, both soft and nontender.    Assessment: Mr. Marc is stable and doing well status post revision right total hip arthroplasty.    Plan: We will discharge him home on analgesics and deep venous thrombosis prophylaxis.  He will follow-up with me approximately 2 weeks from surgery.  He may call [phone] to schedule an appointment.    Meds:   Nhan Marc   Home Medication Instructions TORI:[id number]    Printed on:09/12/17 1130   Medication Information                      Acetaminophen (TYLENOL PO)  Take 1,000 mg by mouth 3 times daily as needed for mild pain or fever             ALPHA LIPOIC ACID PO  Take 1 tablet by mouth every morning OTC- unknown strength             Ascorbic Acid (VITAMIN C PO)  Take 500 " mg by mouth every morning             aspirin  MG EC tablet  Take 1 tablet (325 mg) by mouth 2 times daily             COENZYME Q-10 PO  Take 100 mg by mouth every morning              Colchicine (COLCRYS PO)  Take 0.6 mg by mouth every 2 hours as needed for moderate pain             MAGNESIUM OXIDE PO  Take 250 mg by mouth every morning             Misc Natural Products (TART CHERRY ADVANCED PO)  Take 1 capsule by mouth every morning             multivitamin, therapeutic with minerals (THERA-VIT-M) TABS  Take 1 tablet by mouth every morning              Nutritional Supplements (NUTRITIONAL SUPPLEMENT PO)  Take 1 tablet by mouth 2 times daily (with meals) (breakfast and lunch) URIC ACID CONTROL             Nutritional Supplements (NUTRITIONAL SUPPLEMENT PO)  Take 1 tablet by mouth 3 times daily (with meals) GOUT CONTROL             oxyCODONE (ROXICODONE) 5 MG IR tablet  Take 1-2 tablets (5-10 mg) by mouth every 4 hours as needed for moderate to severe pain             Probiotic Product (PROBIOTIC PO)  Take 1 capsule by mouth every morning              senna-docusate (SENOKOT-S;PERICOLACE) 8.6-50 MG per tablet  Take 1-2 tablets by mouth 2 times daily as needed for constipation             VITAMIN D, CHOLECALCIFEROL, PO  Take 1 tablet by mouth every morning

## 2017-09-16 NOTE — OP NOTE
DATE OF PROCEDURE:  09/06/2017      PREOPERATIVE DIAGNOSIS:  Status post infected total hip arthroplasty with explantation and placement of antibiotic articulated spacer.      POSTOPERATIVE DIAGNOSIS:  Status post infected total hip arthroplasty with explantation and placement of antibiotic articulated spacer.      PROCEDURE:  Right hip reimplantation total hip arthroplasty.      SURGEON:  Selvin Witt MD      ASSISTANT:   JV IRAHETA PA-C      DESCRIPTION OF PROCEDURE:  Nhan Marc was brought to the operating room.  After satisfactory anesthesia, the patient was placed in lateral decubitus position.  Right lower extremity was prepped and draped in usual sterile fashion.  The patient received antibiotics preoperatively based on a weight dosed basis.  Right lower extremity was prepped and draped in usual sterile fashion.  Right lower extremity, the previously made incision was opened.  Dissection was carried down to the fascia.  The fascia was split longitudinally.  Abductors were identified.  A portion of the abductors had distracted somewhat from the greater trochanter.  The anterior half of the abductors were reflected in standard fashion.  Dissection was carried down to the hip capsule.  A capsulectomy was performed.  The hip was dislocated.  Both acetabular and femoral antibiotic spacers were removed without significant difficulty.  The specimen was sent to pathology.  There was no evidence of acute inflammation.  Culture swab was sent as well.  Clinically, there did not appear to be persistent evidence of deep infection and thus based upon the clinical situation it was felt appropriate to proceed with reimplantation.  The hip was then thoroughly irrigated and debrided.  Attention was then first placed in the acetabulum.  Sequential reamers were placed in the acetabulum down to good bleeding bone.  A 64 mm Gription cup was impacted into place in approximately 45 degrees of abduction as well as 15-20 degrees  of anteversion.  The two screws were placed which gave excellent fixation.  A 36 mm neutral liner was impacted into place as well.  Attention was then directed to the femur.  There was some bone loss noted just above the lesser trochanter.  Otherwise the overall integrity of the femur was intact.  Was felt appropriate to proceed with Kingman type stem.  Sequential reaming was placed followed by sequential broaches.  Broaches and reamers were placed up to a size 8.  A size 8 broach gave excellent torsion as well as axial stability.  Trial reduction was performed with +5 mm head and as the hip was reduced there was excellent soft tissue balancing and excellent stability in all planes including combined flexion, internal rotation, extension, external rotation.  The trial components were removed.  The real size 8 Kingman stem was impacted into place and this again gave excellent torsion as well as axial stability.  The +5 ceramic head was impacted into place and the hip was reduced again.  Again, there was excellent stability in all planes.  Good restoration of leg length.  Abductors were closed with interrupted #5 Ethibond and oversewn with #2 FiberWire suture.  The fascia was then closed with running Stratafix suture.  Interrupted 2-0 Vicryl was placed followed by 3-0 Monocryl.  Dermabond Prineo was applied.  Sterile dressing was applied.  The patient left the operating room in satisfactory condition.         LAURA GTZ MD             D: 2017 06:44   T: 2017 11:21   MT: [id number]      Name:     SANDY MONTANO   MRN:      [id number]        Account:        [id number]   :      1945           Procedure Date: 2017      Document: [id number]

## 2017-09-20 LAB
BACTERIA SPEC CULT: NORMAL
Lab: NORMAL
SPECIMEN SOURCE: NORMAL

## 2019-09-09 ENCOUNTER — HOSPITAL ENCOUNTER (INPATIENT)
Facility: CLINIC | Age: 74
Setting detail: SURGERY ADMIT
End: 2019-09-09
Attending: ORTHOPAEDIC SURGERY | Admitting: ORTHOPAEDIC SURGERY
Payer: MEDICARE

## 2019-11-11 ENCOUNTER — DOCUMENTATION ONLY (OUTPATIENT)
Dept: EDUCATION SERVICES | Facility: CLINIC | Age: 74
End: 2019-11-11

## (undated) DEVICE — SU WND CLOSURE VLOC 180 ABS 2-0 24" GS-21 VLOCL0335

## (undated) DEVICE — PACK TOTAL HIP W/POUCH SOP15HPFSM

## (undated) DEVICE — SUCTION IRR SYSTEM W/O TIP INTERPULSE HANDPIECE 0210-100-000

## (undated) DEVICE — GLOVE PROTEXIS W/NEU-THERA 6.0  2D73TE60

## (undated) DEVICE — SU DERMABOND PRINEO 22CM CLR222US

## (undated) DEVICE — BLADE SAW RECIP STRK LONG 70X12.5X0.9MM 0277-096-278

## (undated) DEVICE — ESU GROUND PAD UNIVERSAL W/O CORD

## (undated) DEVICE — GLOVE PROTEXIS POWDER FREE 8.5 ORTHOPEDIC 2D73ET85

## (undated) DEVICE — HOOD FLYTE 0408-800-000

## (undated) DEVICE — SPONGE LAP 18X18" X8435

## (undated) DEVICE — BONE CLEANING TIP INTERPULSE FEMORAL CANAL 0210-008-000

## (undated) DEVICE — DRSG GAUZE 4X4" 3033

## (undated) DEVICE — PREP CHLORAPREP 26ML TINTED ORANGE  260815

## (undated) DEVICE — HOOD FLYTE W/PEELAWAY 408-800-100

## (undated) DEVICE — GLOVE PROTEXIS BLUE W/NEU-THERA 6.5  2D73EB65

## (undated) DEVICE — SU MONOCRYL 3-0 PS-2 27" Y427H

## (undated) DEVICE — DRAIN ROUND W/RESERV KIT JACKSON PRATT 10FR 400ML SU130-402D

## (undated) DEVICE — GOWN IMPERVIOUS SPECIALTY XLG/XLONG 32474

## (undated) DEVICE — SU VICRYL 0 CTX CR 8X18" J764D

## (undated) DEVICE — SU VICRYL 0 CT-1 27" J340H

## (undated) DEVICE — DRAPE IOBAN INCISE 23X17" 6650EZ

## (undated) DEVICE — ESU PENCIL W/SMOKE EVAC NEPTUNE STRYKER 0703-046-000

## (undated) DEVICE — PAD FOAM MCGUIRE KIT 0814-0150

## (undated) DEVICE — SU VICRYL 3-0 PSL 27" UND J502H

## (undated) DEVICE — GLOVE PROTEXIS POWDER FREE 6.5 ORTHOPEDIC 2D73ET65

## (undated) DEVICE — DRSG ADAPTIC 3X8" 6113

## (undated) DEVICE — BLADE CLIPPER 4412A

## (undated) DEVICE — MANIFOLD NEPTUNE 4 PORT 700-20

## (undated) DEVICE — WIPES FOLEY CARE SURESTEP PROVON DFC100

## (undated) DEVICE — SOL NACL 0.9% IRRIG 1000ML BOTTLE 07138-09

## (undated) DEVICE — DRSG ABDOMINAL 07 1/2X8" 7197D

## (undated) DEVICE — IMM PILLOW ABDUCT HIP MED 31143061

## (undated) DEVICE — STRATAFIX SUTURE

## (undated) DEVICE — LINEN TOWEL PACK X5 5464

## (undated) DEVICE — BONE CEMENT MIXEVAC HI VAC W/CARTRIDGE 0306-563-000

## (undated) DEVICE — SU VICRYL 2-0 CP-1 27" UND J266H

## (undated) DEVICE — SU ETHIBOND #5 GS-18 B409T

## (undated) RX ORDER — HYDROMORPHONE HYDROCHLORIDE 1 MG/ML
INJECTION, SOLUTION INTRAMUSCULAR; INTRAVENOUS; SUBCUTANEOUS
Status: DISPENSED
Start: 2017-09-06

## (undated) RX ORDER — LIDOCAINE HYDROCHLORIDE 10 MG/ML
INJECTION, SOLUTION EPIDURAL; INFILTRATION; INTRACAUDAL; PERINEURAL
Status: DISPENSED
Start: 2017-06-01

## (undated) RX ORDER — NEOSTIGMINE METHYLSULFATE 1 MG/ML
VIAL (ML) INJECTION
Status: DISPENSED
Start: 2017-06-30

## (undated) RX ORDER — FENTANYL CITRATE 50 UG/ML
INJECTION, SOLUTION INTRAMUSCULAR; INTRAVENOUS
Status: DISPENSED
Start: 2017-06-30

## (undated) RX ORDER — CEFAZOLIN SODIUM 2 G/100ML
INJECTION, SOLUTION INTRAVENOUS
Status: DISPENSED
Start: 2017-06-30

## (undated) RX ORDER — GLYCOPYRROLATE 0.2 MG/ML
INJECTION, SOLUTION INTRAMUSCULAR; INTRAVENOUS
Status: DISPENSED
Start: 2017-06-30

## (undated) RX ORDER — VECURONIUM BROMIDE 1 MG/ML
INJECTION, POWDER, LYOPHILIZED, FOR SOLUTION INTRAVENOUS
Status: DISPENSED
Start: 2017-09-06

## (undated) RX ORDER — HYDROMORPHONE HYDROCHLORIDE 1 MG/ML
INJECTION, SOLUTION INTRAMUSCULAR; INTRAVENOUS; SUBCUTANEOUS
Status: DISPENSED
Start: 2017-06-30

## (undated) RX ORDER — ALBUMIN, HUMAN INJ 5% 5 %
SOLUTION INTRAVENOUS
Status: DISPENSED
Start: 2017-09-06

## (undated) RX ORDER — MINERAL OIL
OIL (ML) MISCELLANEOUS
Status: DISPENSED
Start: 2017-06-30

## (undated) RX ORDER — BUPIVACAINE HYDROCHLORIDE AND EPINEPHRINE 5; 5 MG/ML; UG/ML
INJECTION, SOLUTION EPIDURAL; INTRACAUDAL; PERINEURAL
Status: DISPENSED
Start: 2017-09-06

## (undated) RX ORDER — CEFAZOLIN SODIUM 1 G/3ML
INJECTION, POWDER, FOR SOLUTION INTRAMUSCULAR; INTRAVENOUS
Status: DISPENSED
Start: 2017-06-30

## (undated) RX ORDER — CEFAZOLIN SODIUM 2 G/100ML
INJECTION, SOLUTION INTRAVENOUS
Status: DISPENSED
Start: 2017-09-06

## (undated) RX ORDER — CEFAZOLIN SODIUM 1 G/3ML
INJECTION, POWDER, FOR SOLUTION INTRAMUSCULAR; INTRAVENOUS
Status: DISPENSED
Start: 2017-09-06

## (undated) RX ORDER — ONDANSETRON 2 MG/ML
INJECTION INTRAMUSCULAR; INTRAVENOUS
Status: DISPENSED
Start: 2017-06-30

## (undated) RX ORDER — PROPOFOL 10 MG/ML
INJECTION, EMULSION INTRAVENOUS
Status: DISPENSED
Start: 2017-06-30

## (undated) RX ORDER — ACETAMINOPHEN 500 MG
TABLET ORAL
Status: DISPENSED
Start: 2017-06-30

## (undated) RX ORDER — LIDOCAINE HYDROCHLORIDE 20 MG/ML
INJECTION, SOLUTION EPIDURAL; INFILTRATION; INTRACAUDAL; PERINEURAL
Status: DISPENSED
Start: 2017-09-06

## (undated) RX ORDER — LIDOCAINE HYDROCHLORIDE 20 MG/ML
INJECTION, SOLUTION EPIDURAL; INFILTRATION; INTRACAUDAL; PERINEURAL
Status: DISPENSED
Start: 2017-06-30

## (undated) RX ORDER — DEXAMETHASONE SODIUM PHOSPHATE 4 MG/ML
INJECTION, SOLUTION INTRA-ARTICULAR; INTRALESIONAL; INTRAMUSCULAR; INTRAVENOUS; SOFT TISSUE
Status: DISPENSED
Start: 2017-06-30

## (undated) RX ORDER — FENTANYL CITRATE 50 UG/ML
INJECTION, SOLUTION INTRAMUSCULAR; INTRAVENOUS
Status: DISPENSED
Start: 2017-09-06

## (undated) RX ORDER — TOBRAMYCIN 1.2 G/30ML
INJECTION, POWDER, LYOPHILIZED, FOR SOLUTION INTRAVENOUS
Status: DISPENSED
Start: 2017-06-30

## (undated) RX ORDER — GLYCOPYRROLATE 0.2 MG/ML
INJECTION, SOLUTION INTRAMUSCULAR; INTRAVENOUS
Status: DISPENSED
Start: 2017-09-06

## (undated) RX ORDER — ONDANSETRON 2 MG/ML
INJECTION INTRAMUSCULAR; INTRAVENOUS
Status: DISPENSED
Start: 2017-09-06

## (undated) RX ORDER — VECURONIUM BROMIDE 1 MG/ML
INJECTION, POWDER, LYOPHILIZED, FOR SOLUTION INTRAVENOUS
Status: DISPENSED
Start: 2017-06-30

## (undated) RX ORDER — ACETAMINOPHEN 500 MG
TABLET ORAL
Status: DISPENSED
Start: 2017-09-06

## (undated) RX ORDER — PROPOFOL 10 MG/ML
INJECTION, EMULSION INTRAVENOUS
Status: DISPENSED
Start: 2017-09-06